# Patient Record
Sex: FEMALE | Employment: OTHER | URBAN - METROPOLITAN AREA
[De-identification: names, ages, dates, MRNs, and addresses within clinical notes are randomized per-mention and may not be internally consistent; named-entity substitution may affect disease eponyms.]

---

## 2022-05-26 ENCOUNTER — HOSPITAL ENCOUNTER (OUTPATIENT)
Dept: TELEMEDICINE | Facility: HOSPITAL | Age: 82
Discharge: HOME | End: 2022-05-26
Payer: MEDICARE

## 2022-05-26 PROCEDURE — G0427 PR INPT TELEHEALTH CON 70/>M: ICD-10-PCS | Mod: 95,,, | Performed by: PSYCHIATRY & NEUROLOGY

## 2022-05-26 PROCEDURE — G0427 INPT/ED TELECONSULT70: HCPCS | Mod: 95,,, | Performed by: PSYCHIATRY & NEUROLOGY

## 2022-05-27 ENCOUNTER — HOSPITAL ENCOUNTER (INPATIENT)
Facility: HOSPITAL | Age: 82
LOS: 3 days | Discharge: HOSPICE | DRG: 023 | End: 2022-05-30
Attending: EMERGENCY MEDICINE | Admitting: PSYCHIATRY & NEUROLOGY
Payer: MEDICARE

## 2022-05-27 DIAGNOSIS — I63.9 CEREBROVASCULAR ACCIDENT (CVA), UNSPECIFIED MECHANISM: ICD-10-CM

## 2022-05-27 DIAGNOSIS — I63.9 CVA (CEREBRAL VASCULAR ACCIDENT): ICD-10-CM

## 2022-05-27 DIAGNOSIS — I63.9 STROKE: Primary | ICD-10-CM

## 2022-05-27 DIAGNOSIS — R47.01 APHASIA: ICD-10-CM

## 2022-05-27 PROBLEM — I48.91 A-FIB: Status: ACTIVE | Noted: 2022-05-27

## 2022-05-27 PROBLEM — E03.9 HYPOTHYROIDISM: Status: ACTIVE | Noted: 2022-05-27

## 2022-05-27 PROBLEM — Z79.01 CURRENT USE OF LONG TERM ANTICOAGULATION: Status: ACTIVE | Noted: 2022-05-27

## 2022-05-27 PROBLEM — R53.81 DEBILITY: Status: ACTIVE | Noted: 2022-05-27

## 2022-05-27 PROBLEM — G93.6 CYTOTOXIC CEREBRAL EDEMA: Status: ACTIVE | Noted: 2022-05-27

## 2022-05-27 PROBLEM — I63.412 EMBOLIC STROKE INVOLVING LEFT MIDDLE CEREBRAL ARTERY: Status: ACTIVE | Noted: 2022-05-27

## 2022-05-27 LAB
ABO + RH BLD: NORMAL
ALBUMIN SERPL BCP-MCNC: 3.6 G/DL (ref 3.5–5.2)
ALBUMIN SERPL BCP-MCNC: 4 G/DL (ref 3.5–5.2)
ALP SERPL-CCNC: 55 U/L (ref 55–135)
ALP SERPL-CCNC: 57 U/L (ref 55–135)
ALT SERPL W/O P-5'-P-CCNC: 12 U/L (ref 10–44)
ALT SERPL W/O P-5'-P-CCNC: 16 U/L (ref 10–44)
ANION GAP SERPL CALC-SCNC: 11 MMOL/L (ref 8–16)
ANION GAP SERPL CALC-SCNC: 13 MMOL/L (ref 8–16)
ASCENDING AORTA: 2.94 CM
AST SERPL-CCNC: 22 U/L (ref 10–40)
AST SERPL-CCNC: 35 U/L (ref 10–40)
AV INDEX (PROSTH): 0.98
AV MEAN GRADIENT: 2 MMHG
AV PEAK GRADIENT: 4 MMHG
AV VALVE AREA: 3.41 CM2
AV VELOCITY RATIO: 0.83
BASOPHILS # BLD AUTO: 0.02 K/UL (ref 0–0.2)
BASOPHILS # BLD AUTO: 0.03 K/UL (ref 0–0.2)
BASOPHILS NFR BLD: 0.2 % (ref 0–1.9)
BASOPHILS NFR BLD: 0.3 % (ref 0–1.9)
BILIRUB SERPL-MCNC: 0.7 MG/DL (ref 0.1–1)
BILIRUB SERPL-MCNC: 0.7 MG/DL (ref 0.1–1)
BLD GP AB SCN CELLS X3 SERPL QL: NORMAL
BUN SERPL-MCNC: 14 MG/DL (ref 8–23)
BUN SERPL-MCNC: 15 MG/DL (ref 8–23)
BUN SERPL-MCNC: 20 MG/DL (ref 6–30)
CALCIUM SERPL-MCNC: 8.9 MG/DL (ref 8.7–10.5)
CALCIUM SERPL-MCNC: 9.3 MG/DL (ref 8.7–10.5)
CHLORIDE SERPL-SCNC: 101 MMOL/L (ref 95–110)
CHLORIDE SERPL-SCNC: 103 MMOL/L (ref 95–110)
CHLORIDE SERPL-SCNC: 104 MMOL/L (ref 95–110)
CHOLEST SERPL-MCNC: 154 MG/DL (ref 120–199)
CHOLEST SERPL-MCNC: 170 MG/DL (ref 120–199)
CHOLEST/HDLC SERPL: 3.9 {RATIO} (ref 2–5)
CHOLEST/HDLC SERPL: 4.1 {RATIO} (ref 2–5)
CO2 SERPL-SCNC: 22 MMOL/L (ref 23–29)
CO2 SERPL-SCNC: 22 MMOL/L (ref 23–29)
CREAT SERPL-MCNC: 0.7 MG/DL (ref 0.5–1.4)
CREAT SERPL-MCNC: 0.8 MG/DL (ref 0.5–1.4)
CTP QC/QA: YES
CV ECHO LV RWT: 0.41 CM
DIFFERENTIAL METHOD: ABNORMAL
DIFFERENTIAL METHOD: ABNORMAL
DIGOXIN SERPL-MCNC: 0.5 NG/ML (ref 0.8–2)
DOP CALC AO PEAK VEL: 1.03 M/S
DOP CALC AO VTI: 21.9 CM
DOP CALC LVOT AREA: 3.5 CM2
DOP CALC LVOT DIAMETER: 2.1 CM
DOP CALC LVOT PEAK VEL: 0.85 M/S
DOP CALC LVOT STROKE VOLUME: 74.67 CM3
DOP CALCLVOT PEAK VEL VTI: 21.57 CM
E/E' RATIO: 9.77 M/S
ECHO LV POSTERIOR WALL: 0.87 CM (ref 0.6–1.1)
EJECTION FRACTION: 65 %
EOSINOPHIL # BLD AUTO: 0 K/UL (ref 0–0.5)
EOSINOPHIL # BLD AUTO: 0 K/UL (ref 0–0.5)
EOSINOPHIL NFR BLD: 0 % (ref 0–8)
EOSINOPHIL NFR BLD: 0.1 % (ref 0–8)
ERYTHROCYTE [DISTWIDTH] IN BLOOD BY AUTOMATED COUNT: 13.8 % (ref 11.5–14.5)
ERYTHROCYTE [DISTWIDTH] IN BLOOD BY AUTOMATED COUNT: 13.9 % (ref 11.5–14.5)
EST. GFR  (AFRICAN AMERICAN): >60 ML/MIN/1.73 M^2
EST. GFR  (AFRICAN AMERICAN): >60 ML/MIN/1.73 M^2
EST. GFR  (NON AFRICAN AMERICAN): >60 ML/MIN/1.73 M^2
EST. GFR  (NON AFRICAN AMERICAN): >60 ML/MIN/1.73 M^2
ESTIMATED AVG GLUCOSE: 97 MG/DL (ref 68–131)
FRACTIONAL SHORTENING: 37 % (ref 28–44)
GLUCOSE SERPL-MCNC: 138 MG/DL (ref 70–110)
GLUCOSE SERPL-MCNC: 139 MG/DL (ref 70–110)
GLUCOSE SERPL-MCNC: 157 MG/DL (ref 70–110)
HBA1C MFR BLD: 5 % (ref 4–5.6)
HCT VFR BLD AUTO: 38.5 % (ref 37–48.5)
HCT VFR BLD AUTO: 38.5 % (ref 37–48.5)
HCT VFR BLD CALC: 40 %PCV (ref 36–54)
HCV AB SERPL QL IA: NEGATIVE
HDLC SERPL-MCNC: 40 MG/DL (ref 40–75)
HDLC SERPL-MCNC: 41 MG/DL (ref 40–75)
HDLC SERPL: 24.1 % (ref 20–50)
HDLC SERPL: 26 % (ref 20–50)
HGB BLD-MCNC: 12 G/DL (ref 12–16)
HGB BLD-MCNC: 12.2 G/DL (ref 12–16)
IMM GRANULOCYTES # BLD AUTO: 0.03 K/UL (ref 0–0.04)
IMM GRANULOCYTES # BLD AUTO: 0.05 K/UL (ref 0–0.04)
IMM GRANULOCYTES NFR BLD AUTO: 0.3 % (ref 0–0.5)
IMM GRANULOCYTES NFR BLD AUTO: 0.4 % (ref 0–0.5)
INR PPP: 1.1 (ref 0.8–1.2)
INTERVENTRICULAR SEPTUM: 0.82 CM (ref 0.6–1.1)
IVRT: 55.19 MSEC
LA MAJOR: 6.4 CM
LA MINOR: 6.38 CM
LA WIDTH: 4.74 CM
LDLC SERPL CALC-MCNC: 104.6 MG/DL (ref 63–159)
LDLC SERPL CALC-MCNC: 114 MG/DL (ref 63–159)
LEFT ATRIUM SIZE: 4.05 CM
LEFT ATRIUM VOLUME MOD: 82.42 CM3
LEFT ATRIUM VOLUME: 104.27 CM3
LEFT INTERNAL DIMENSION IN SYSTOLE: 2.7 CM (ref 2.1–4)
LEFT VENTRICLE DIASTOLIC VOLUME: 81.97 ML
LEFT VENTRICLE SYSTOLIC VOLUME: 27.12 ML
LEFT VENTRICULAR INTERNAL DIMENSION IN DIASTOLE: 4.28 CM (ref 3.5–6)
LEFT VENTRICULAR MASS: 112.4 G
LV LATERAL E/E' RATIO: 8.47 M/S
LV SEPTAL E/E' RATIO: 11.55 M/S
LYMPHOCYTES # BLD AUTO: 0.4 K/UL (ref 1–4.8)
LYMPHOCYTES # BLD AUTO: 0.4 K/UL (ref 1–4.8)
LYMPHOCYTES NFR BLD: 2.8 % (ref 18–48)
LYMPHOCYTES NFR BLD: 4.2 % (ref 18–48)
MAGNESIUM SERPL-MCNC: 1.8 MG/DL (ref 1.6–2.6)
MCH RBC QN AUTO: 26.7 PG (ref 27–31)
MCH RBC QN AUTO: 26.8 PG (ref 27–31)
MCHC RBC AUTO-ENTMCNC: 31.2 G/DL (ref 32–36)
MCHC RBC AUTO-ENTMCNC: 31.7 G/DL (ref 32–36)
MCV RBC AUTO: 85 FL (ref 82–98)
MCV RBC AUTO: 86 FL (ref 82–98)
MONOCYTES # BLD AUTO: 0.3 K/UL (ref 0.3–1)
MONOCYTES # BLD AUTO: 0.4 K/UL (ref 0.3–1)
MONOCYTES NFR BLD: 3.3 % (ref 4–15)
MONOCYTES NFR BLD: 3.5 % (ref 4–15)
MV PEAK E VEL: 1.27 M/S
NEUTROPHILS # BLD AUTO: 11.6 K/UL (ref 1.8–7.7)
NEUTROPHILS # BLD AUTO: 9.2 K/UL (ref 1.8–7.7)
NEUTROPHILS NFR BLD: 91.8 % (ref 38–73)
NEUTROPHILS NFR BLD: 93.1 % (ref 38–73)
NONHDLC SERPL-MCNC: 114 MG/DL
NONHDLC SERPL-MCNC: 129 MG/DL
NRBC BLD-RTO: 0 /100 WBC
NRBC BLD-RTO: 0 /100 WBC
PHOSPHATE SERPL-MCNC: 2.7 MG/DL (ref 2.7–4.5)
PISA TR MAX VEL: 3.3 M/S
PLATELET # BLD AUTO: 272 K/UL (ref 150–450)
PLATELET # BLD AUTO: 278 K/UL (ref 150–450)
PMV BLD AUTO: 11 FL (ref 9.2–12.9)
PMV BLD AUTO: 11.6 FL (ref 9.2–12.9)
POC IONIZED CALCIUM: 1.12 MMOL/L (ref 1.06–1.42)
POC PTINR: 1 (ref 0.9–1.2)
POC PTWBT: 11.7 SEC (ref 9.7–14.3)
POC TCO2 (MEASURED): 28 MMOL/L (ref 23–29)
POCT GLUCOSE: 140 MG/DL (ref 70–110)
POCT GLUCOSE: 142 MG/DL (ref 70–110)
POCT GLUCOSE: 149 MG/DL (ref 70–110)
POTASSIUM BLD-SCNC: 4.2 MMOL/L (ref 3.5–5.1)
POTASSIUM SERPL-SCNC: 3.3 MMOL/L (ref 3.5–5.1)
POTASSIUM SERPL-SCNC: 4.1 MMOL/L (ref 3.5–5.1)
PROT SERPL-MCNC: 6.3 G/DL (ref 6–8.4)
PROT SERPL-MCNC: 7.3 G/DL (ref 6–8.4)
PROTHROMBIN TIME: 11.3 SEC (ref 9–12.5)
RA MAJOR: 6.2 CM
RA PRESSURE: 15 MMHG
RA WIDTH: 4.43 CM
RBC # BLD AUTO: 4.5 M/UL (ref 4–5.4)
RBC # BLD AUTO: 4.55 M/UL (ref 4–5.4)
RIGHT ATRIAL AREA: 25 CM2
RIGHT VENTRICULAR END-DIASTOLIC DIMENSION: 3.09 CM
RV TISSUE DOPPLER FREE WALL SYSTOLIC VELOCITY 1 (APICAL 4 CHAMBER VIEW): 14.85 CM/S
SAMPLE: ABNORMAL
SAMPLE: NORMAL
SAMPLE: NORMAL
SARS-COV-2 RDRP RESP QL NAA+PROBE: NEGATIVE
SINUS: 2.82 CM
SODIUM BLD-SCNC: 138 MMOL/L (ref 136–145)
SODIUM SERPL-SCNC: 137 MMOL/L (ref 136–145)
SODIUM SERPL-SCNC: 138 MMOL/L (ref 136–145)
STJ: 2.24 CM
T4 FREE SERPL-MCNC: 1.13 NG/DL (ref 0.71–1.51)
T4 FREE SERPL-MCNC: 1.16 NG/DL (ref 0.71–1.51)
TDI LATERAL: 0.15 M/S
TDI SEPTAL: 0.11 M/S
TDI: 0.13 M/S
TR MAX PG: 44 MMHG
TRICUSPID ANNULAR PLANE SYSTOLIC EXCURSION: 1.41 CM
TRIGL SERPL-MCNC: 47 MG/DL (ref 30–150)
TRIGL SERPL-MCNC: 75 MG/DL (ref 30–150)
TSH SERPL DL<=0.005 MIU/L-ACNC: 0.09 UIU/ML (ref 0.4–4)
TSH SERPL DL<=0.005 MIU/L-ACNC: 0.1 UIU/ML (ref 0.4–4)
TV REST PULMONARY ARTERY PRESSURE: 59 MMHG
WBC # BLD AUTO: 10.01 K/UL (ref 3.9–12.7)
WBC # BLD AUTO: 12.49 K/UL (ref 3.9–12.7)

## 2022-05-27 PROCEDURE — 84443 ASSAY THYROID STIM HORMONE: CPT | Performed by: EMERGENCY MEDICINE

## 2022-05-27 PROCEDURE — 85610 PROTHROMBIN TIME: CPT | Performed by: EMERGENCY MEDICINE

## 2022-05-27 PROCEDURE — 99900035 HC TECH TIME PER 15 MIN (STAT)

## 2022-05-27 PROCEDURE — 25000003 PHARM REV CODE 250: Performed by: NURSE PRACTITIONER

## 2022-05-27 PROCEDURE — 93010 ELECTROCARDIOGRAM REPORT: CPT | Mod: ,,, | Performed by: INTERNAL MEDICINE

## 2022-05-27 PROCEDURE — 20000000 HC ICU ROOM

## 2022-05-27 PROCEDURE — 84100 ASSAY OF PHOSPHORUS: CPT | Performed by: NURSE PRACTITIONER

## 2022-05-27 PROCEDURE — 97163 PT EVAL HIGH COMPLEX 45 MIN: CPT

## 2022-05-27 PROCEDURE — 97535 SELF CARE MNGMENT TRAINING: CPT

## 2022-05-27 PROCEDURE — 83735 ASSAY OF MAGNESIUM: CPT | Performed by: NURSE PRACTITIONER

## 2022-05-27 PROCEDURE — 84443 ASSAY THYROID STIM HORMONE: CPT | Mod: 91 | Performed by: NURSE PRACTITIONER

## 2022-05-27 PROCEDURE — 80061 LIPID PANEL: CPT | Mod: 91 | Performed by: NURSE PRACTITIONER

## 2022-05-27 PROCEDURE — 99285 EMERGENCY DEPT VISIT HI MDM: CPT | Mod: CS,,, | Performed by: EMERGENCY MEDICINE

## 2022-05-27 PROCEDURE — 84439 ASSAY OF FREE THYROXINE: CPT | Performed by: EMERGENCY MEDICINE

## 2022-05-27 PROCEDURE — 94761 N-INVAS EAR/PLS OXIMETRY MLT: CPT

## 2022-05-27 PROCEDURE — 82565 ASSAY OF CREATININE: CPT

## 2022-05-27 PROCEDURE — 80162 ASSAY OF DIGOXIN TOTAL: CPT | Performed by: NURSE PRACTITIONER

## 2022-05-27 PROCEDURE — 83036 HEMOGLOBIN GLYCOSYLATED A1C: CPT | Performed by: NURSE PRACTITIONER

## 2022-05-27 PROCEDURE — 97112 NEUROMUSCULAR REEDUCATION: CPT

## 2022-05-27 PROCEDURE — 97167 OT EVAL HIGH COMPLEX 60 MIN: CPT

## 2022-05-27 PROCEDURE — 99291 PR CRITICAL CARE, E/M 30-74 MINUTES: ICD-10-PCS | Mod: ,,, | Performed by: NURSE PRACTITIONER

## 2022-05-27 PROCEDURE — 86803 HEPATITIS C AB TEST: CPT | Performed by: EMERGENCY MEDICINE

## 2022-05-27 PROCEDURE — 99285 PR EMERGENCY DEPT VISIT,LEVEL V: ICD-10-PCS | Mod: CS,,, | Performed by: EMERGENCY MEDICINE

## 2022-05-27 PROCEDURE — 99223 1ST HOSP IP/OBS HIGH 75: CPT | Mod: ,,, | Performed by: PSYCHIATRY & NEUROLOGY

## 2022-05-27 PROCEDURE — 93005 ELECTROCARDIOGRAM TRACING: CPT

## 2022-05-27 PROCEDURE — 85025 COMPLETE CBC W/AUTO DIFF WBC: CPT | Mod: 91 | Performed by: NURSE PRACTITIONER

## 2022-05-27 PROCEDURE — 99223 PR INITIAL HOSPITAL CARE,LEVL III: ICD-10-PCS | Mod: ,,, | Performed by: PSYCHIATRY & NEUROLOGY

## 2022-05-27 PROCEDURE — 80053 COMPREHEN METABOLIC PANEL: CPT | Performed by: EMERGENCY MEDICINE

## 2022-05-27 PROCEDURE — 86901 BLOOD TYPING SEROLOGIC RH(D): CPT | Performed by: NURSE PRACTITIONER

## 2022-05-27 PROCEDURE — 63600175 PHARM REV CODE 636 W HCPCS: Performed by: NURSE PRACTITIONER

## 2022-05-27 PROCEDURE — 25000003 PHARM REV CODE 250: Performed by: PSYCHIATRY & NEUROLOGY

## 2022-05-27 PROCEDURE — 80053 COMPREHEN METABOLIC PANEL: CPT | Mod: 91 | Performed by: NURSE PRACTITIONER

## 2022-05-27 PROCEDURE — 63600175 PHARM REV CODE 636 W HCPCS: Performed by: NEUROLOGICAL SURGERY

## 2022-05-27 PROCEDURE — 93010 EKG 12-LEAD: ICD-10-PCS | Mod: ,,, | Performed by: INTERNAL MEDICINE

## 2022-05-27 PROCEDURE — 85025 COMPLETE CBC W/AUTO DIFF WBC: CPT | Performed by: EMERGENCY MEDICINE

## 2022-05-27 PROCEDURE — 99285 EMERGENCY DEPT VISIT HI MDM: CPT | Mod: 25

## 2022-05-27 PROCEDURE — 84439 ASSAY OF FREE THYROXINE: CPT | Mod: 91 | Performed by: NURSE PRACTITIONER

## 2022-05-27 PROCEDURE — 99291 CRITICAL CARE FIRST HOUR: CPT | Mod: ,,, | Performed by: NURSE PRACTITIONER

## 2022-05-27 PROCEDURE — 25500020 PHARM REV CODE 255: Performed by: EMERGENCY MEDICINE

## 2022-05-27 PROCEDURE — 97530 THERAPEUTIC ACTIVITIES: CPT

## 2022-05-27 PROCEDURE — 80061 LIPID PANEL: CPT | Performed by: EMERGENCY MEDICINE

## 2022-05-27 PROCEDURE — 85610 PROTHROMBIN TIME: CPT

## 2022-05-27 PROCEDURE — U0002 COVID-19 LAB TEST NON-CDC: HCPCS | Performed by: EMERGENCY MEDICINE

## 2022-05-27 RX ORDER — LEVOTHYROXINE SODIUM 88 UG/1
88 TABLET ORAL
COMMUNITY

## 2022-05-27 RX ORDER — AMOXICILLIN 250 MG
1 CAPSULE ORAL 2 TIMES DAILY
Status: DISCONTINUED | OUTPATIENT
Start: 2022-05-27 | End: 2022-05-27

## 2022-05-27 RX ORDER — HYDRALAZINE HYDROCHLORIDE 20 MG/ML
10 INJECTION INTRAMUSCULAR; INTRAVENOUS EVERY 4 HOURS PRN
Status: DISCONTINUED | OUTPATIENT
Start: 2022-05-27 | End: 2022-05-27

## 2022-05-27 RX ORDER — LANOLIN ALCOHOL/MO/W.PET/CERES
800 CREAM (GRAM) TOPICAL
Status: DISCONTINUED | OUTPATIENT
Start: 2022-05-27 | End: 2022-05-30

## 2022-05-27 RX ORDER — ENALAPRIL MALEATE 10 MG/1
10 TABLET ORAL 2 TIMES DAILY
COMMUNITY

## 2022-05-27 RX ORDER — AMOXICILLIN 250 MG
1 CAPSULE ORAL 2 TIMES DAILY
Status: DISCONTINUED | OUTPATIENT
Start: 2022-05-27 | End: 2022-05-30

## 2022-05-27 RX ORDER — SODIUM,POTASSIUM PHOSPHATES 280-250MG
2 POWDER IN PACKET (EA) ORAL
Status: DISCONTINUED | OUTPATIENT
Start: 2022-05-27 | End: 2022-05-30

## 2022-05-27 RX ORDER — NICARDIPINE HYDROCHLORIDE 0.2 MG/ML
0-15 INJECTION INTRAVENOUS CONTINUOUS
Status: DISCONTINUED | OUTPATIENT
Start: 2022-05-27 | End: 2022-05-27

## 2022-05-27 RX ORDER — NICARDIPINE HYDROCHLORIDE 0.2 MG/ML
0-15 INJECTION INTRAVENOUS CONTINUOUS
Status: DISCONTINUED | OUTPATIENT
Start: 2022-05-27 | End: 2022-05-28

## 2022-05-27 RX ORDER — MUPIROCIN 20 MG/G
OINTMENT TOPICAL 2 TIMES DAILY
Status: DISCONTINUED | OUTPATIENT
Start: 2022-05-27 | End: 2022-05-30

## 2022-05-27 RX ORDER — LEVOTHYROXINE SODIUM 88 UG/1
88 TABLET ORAL
Status: DISCONTINUED | OUTPATIENT
Start: 2022-05-27 | End: 2022-05-30

## 2022-05-27 RX ORDER — DIGOXIN 250 MCG
250 TABLET ORAL DAILY
COMMUNITY

## 2022-05-27 RX ORDER — LABETALOL HYDROCHLORIDE 5 MG/ML
10 INJECTION, SOLUTION INTRAVENOUS EVERY 4 HOURS PRN
Status: DISCONTINUED | OUTPATIENT
Start: 2022-05-27 | End: 2022-05-30

## 2022-05-27 RX ORDER — HYDROCHLOROTHIAZIDE 12.5 MG/1
25 TABLET ORAL DAILY
COMMUNITY

## 2022-05-27 RX ORDER — SODIUM CHLORIDE 9 MG/ML
INJECTION, SOLUTION INTRAVENOUS CONTINUOUS
Status: DISCONTINUED | OUTPATIENT
Start: 2022-05-27 | End: 2022-05-27

## 2022-05-27 RX ORDER — DIGOXIN 125 MCG
0.25 TABLET ORAL DAILY
Status: DISCONTINUED | OUTPATIENT
Start: 2022-05-27 | End: 2022-05-30

## 2022-05-27 RX ORDER — SODIUM CHLORIDE 0.9 % (FLUSH) 0.9 %
10 SYRINGE (ML) INJECTION
Status: DISCONTINUED | OUTPATIENT
Start: 2022-05-27 | End: 2022-05-30

## 2022-05-27 RX ORDER — LABETALOL HYDROCHLORIDE 5 MG/ML
10 INJECTION, SOLUTION INTRAVENOUS EVERY 4 HOURS PRN
Status: DISCONTINUED | OUTPATIENT
Start: 2022-05-27 | End: 2022-05-27

## 2022-05-27 RX ORDER — HYDRALAZINE HYDROCHLORIDE 20 MG/ML
10 INJECTION INTRAMUSCULAR; INTRAVENOUS EVERY 4 HOURS PRN
Status: DISCONTINUED | OUTPATIENT
Start: 2022-05-27 | End: 2022-05-30

## 2022-05-27 RX ORDER — ATORVASTATIN CALCIUM 40 MG/1
40 TABLET, FILM COATED ORAL DAILY
Status: DISCONTINUED | OUTPATIENT
Start: 2022-05-27 | End: 2022-05-27

## 2022-05-27 RX ORDER — HEPARIN SODIUM 1000 [USP'U]/ML
INJECTION, SOLUTION INTRAVENOUS; SUBCUTANEOUS CODE/TRAUMA/SEDATION MEDICATION
Status: COMPLETED | OUTPATIENT
Start: 2022-05-27 | End: 2022-05-27

## 2022-05-27 RX ORDER — FENTANYL CITRATE 50 UG/ML
INJECTION, SOLUTION INTRAMUSCULAR; INTRAVENOUS CODE/TRAUMA/SEDATION MEDICATION
Status: COMPLETED | OUTPATIENT
Start: 2022-05-27 | End: 2022-05-27

## 2022-05-27 RX ORDER — METOPROLOL TARTRATE 25 MG/1
12.5 TABLET ORAL 2 TIMES DAILY
Status: DISCONTINUED | OUTPATIENT
Start: 2022-05-27 | End: 2022-05-30

## 2022-05-27 RX ORDER — ACETAMINOPHEN 325 MG/1
650 TABLET ORAL EVERY 6 HOURS PRN
Status: DISCONTINUED | OUTPATIENT
Start: 2022-05-27 | End: 2022-05-27

## 2022-05-27 RX ORDER — ATORVASTATIN CALCIUM 20 MG/1
40 TABLET, FILM COATED ORAL DAILY
Status: DISCONTINUED | OUTPATIENT
Start: 2022-05-27 | End: 2022-05-30

## 2022-05-27 RX ORDER — ACETAMINOPHEN 325 MG/1
650 TABLET ORAL EVERY 6 HOURS PRN
Status: DISCONTINUED | OUTPATIENT
Start: 2022-05-27 | End: 2022-05-30

## 2022-05-27 RX ORDER — ONDANSETRON 2 MG/ML
4 INJECTION INTRAMUSCULAR; INTRAVENOUS EVERY 6 HOURS PRN
Status: DISCONTINUED | OUTPATIENT
Start: 2022-05-27 | End: 2022-05-30

## 2022-05-27 RX ORDER — IODIXANOL 320 MG/ML
200 INJECTION, SOLUTION INTRAVASCULAR
Status: COMPLETED | OUTPATIENT
Start: 2022-05-27 | End: 2022-05-27

## 2022-05-27 RX ORDER — METOPROLOL SUCCINATE 25 MG/1
25 TABLET, EXTENDED RELEASE ORAL DAILY
COMMUNITY

## 2022-05-27 RX ADMIN — SODIUM CHLORIDE: 0.9 INJECTION, SOLUTION INTRAVENOUS at 05:05

## 2022-05-27 RX ADMIN — ONDANSETRON 4 MG: 2 INJECTION INTRAMUSCULAR; INTRAVENOUS at 11:05

## 2022-05-27 RX ADMIN — DIGOXIN 0.25 MG: 125 TABLET ORAL at 09:05

## 2022-05-27 RX ADMIN — Medication 800 MG: at 03:05

## 2022-05-27 RX ADMIN — IODIXANOL 75 ML: 320 INJECTION, SOLUTION INTRAVASCULAR at 04:05

## 2022-05-27 RX ADMIN — HEPARIN SODIUM 25000 UNITS: 1000 INJECTION INTRAVENOUS; SUBCUTANEOUS at 03:05

## 2022-05-27 RX ADMIN — METOPROLOL TARTRATE 12.5 MG: 25 TABLET, FILM COATED ORAL at 08:05

## 2022-05-27 RX ADMIN — POTASSIUM BICARBONATE 50 MEQ: 978 TABLET, EFFERVESCENT ORAL at 03:05

## 2022-05-27 RX ADMIN — ATORVASTATIN CALCIUM 40 MG: 20 TABLET, FILM COATED ORAL at 09:05

## 2022-05-27 RX ADMIN — MUPIROCIN: 20 OINTMENT TOPICAL at 08:05

## 2022-05-27 RX ADMIN — Medication 800 MG: at 09:05

## 2022-05-27 RX ADMIN — NICARDIPINE HYDROCHLORIDE 2.5 MG/HR: 0.2 INJECTION, SOLUTION INTRAVENOUS at 09:05

## 2022-05-27 RX ADMIN — FENTANYL CITRATE 50 MCG: 50 INJECTION, SOLUTION INTRAMUSCULAR; INTRAVENOUS at 03:05

## 2022-05-27 RX ADMIN — NICARDIPINE HYDROCHLORIDE 5 MG/HR: 0.2 INJECTION, SOLUTION INTRAVENOUS at 03:05

## 2022-05-27 RX ADMIN — HYDRALAZINE HYDROCHLORIDE 10 MG: 20 INJECTION, SOLUTION INTRAMUSCULAR; INTRAVENOUS at 06:05

## 2022-05-27 RX ADMIN — LEVOTHYROXINE SODIUM 88 MCG: 88 TABLET ORAL at 09:05

## 2022-05-27 RX ADMIN — SENNOSIDES AND DOCUSATE SODIUM 1 TABLET: 50; 8.6 TABLET ORAL at 09:05

## 2022-05-27 RX ADMIN — METOPROLOL TARTRATE 12.5 MG: 25 TABLET, FILM COATED ORAL at 09:05

## 2022-05-27 RX ADMIN — SENNOSIDES AND DOCUSATE SODIUM 1 TABLET: 50; 8.6 TABLET ORAL at 08:05

## 2022-05-27 RX ADMIN — LABETALOL HYDROCHLORIDE 10 MG: 5 INJECTION INTRAVENOUS at 06:05

## 2022-05-27 RX ADMIN — NICARDIPINE HYDROCHLORIDE 5 MG/HR: 0.2 INJECTION, SOLUTION INTRAVENOUS at 07:05

## 2022-05-27 RX ADMIN — MUPIROCIN: 20 OINTMENT TOPICAL at 09:05

## 2022-05-27 RX ADMIN — NICARDIPINE HYDROCHLORIDE 5 MG/HR: 0.2 INJECTION, SOLUTION INTRAVENOUS at 08:05

## 2022-05-27 NOTE — NURSING
Patient arrived to IR    Type of stroke/diagnosis:  Left MCA stroke      Thrombectomy start and end time   Start time 0340  End time 0423    Current symptoms: Aphasic, follows simple commands unable to move right side, spont movement of left side, right side facial droop    Skin assessment done: Y    Wounds noted: None      Laura Completed? N    Patient Belongings on Admit: None noted    NCC notified: Raza FELICIANO

## 2022-05-27 NOTE — ASSESSMENT & PLAN NOTE
Emilia Cotto is a 81 y.o. female with significant medical history of Afib (on Eliquis), HTN, hypothyroidism,  presented to hospital as a transfer from [facility] for evaluation of L MCA stroke.  tPA not administered due to the patient taking Eliquis.  CTA head neck with occlusion of the L2 segment of the MCA.  The patient taken to IR for 3 by angiogram, possible thrombectomy.      Antithrombotics for secondary stroke prevention: Anticoagulants: Apixaban 5 mg BID     Statins for secondary stroke prevention and hyperlipidemia, if present:   Statins: Atorvastatin- 40 mg daily    Aggressive risk factor modification: HTN, A-Fib     Rehab efforts: The patient has been evaluated by a stroke team provider and the therapy needs have been fully considered based off the presenting complaints and exam findings. The following therapy evaluations are needed: PT evaluate and treat, OT evaluate and treat, SLP evaluate and treat    Diagnostics ordered/pending: HgbA1C to assess blood glucose levels, Lipid Profile to assess cholesterol levels, MRI head without contrast to assess brain parenchyma, TTE to assess cardiac function/status , TSH to assess thyroid function, Other: Cerebral Angiogram    VTE prophylaxis: Mechanical prophylaxis: Place SCDs  None: Reason for No Pharmacological VTE Prophylaxis: Currently on anticoagulation    BP parameters: Infarct: Post sucessful thrombectomy, SBP <140  Post unsucessful thrombectomy, SBP <220

## 2022-05-27 NOTE — ASSESSMENT & PLAN NOTE
-Small area of cytotoxic cerebral edema identified when reviewing brain imaging in the territory of the L middle cerebral artery. There is no mass effect associated with it. We will continue to monitor the patients clinical exam with Q4h neuro checks while on the floor, more frequently while in neuro critical care, for any worsening of symptoms which may indicate expansion of the insult and/or area of the edema resulting in clinical change that may require acute intervention to prevent loss of function and/or death. The pattern is suggestive of embolic etiology.

## 2022-05-27 NOTE — SUBJECTIVE & OBJECTIVE
Past Medical History:   Diagnosis Date    A-fib     A-fib     Anticoagulant long-term use     Hypertension     Thyroid disease      Past Surgical History:   Procedure Laterality Date    HYSTERECTOMY      THYROID SURGERY Right     lobectomy      No current facility-administered medications on file prior to encounter.     Current Outpatient Medications on File Prior to Encounter   Medication Sig Dispense Refill    apixaban (ELIQUIS) 5 mg Tab Take 5 mg by mouth 2 (two) times daily.      digoxin (LANOXIN) 250 mcg tablet Take 250 mcg by mouth once daily.      enalapril (VASOTEC) 10 MG tablet Take 10 mg by mouth 2 (two) times daily.      hydroCHLOROthiazide (HYDRODIURIL) 12.5 MG Tab Take 25 mg by mouth once daily.      levothyroxine (SYNTHROID) 88 MCG tablet Take 88 mcg by mouth before breakfast.      metoprolol succinate (TOPROL-XL) 25 MG 24 hr tablet Take 25 mg by mouth once daily.        Allergies: Patient has no known allergies.    Family History   Problem Relation Age of Onset    Diabetes Mother     Diabetes Sister      Social History     Tobacco Use    Smoking status: Former Smoker     Quit date: 1995     Years since quittin.9    Smokeless tobacco: Former User   Substance Use Topics    Alcohol use: Not Currently     Review of Systems   Unable to perform ROS: Patient nonverbal (aphasia)   Objective:     Vitals:    Pulse: 78  BP: (!) 184/81  MAP (mmHg): 117  Resp: (!) 22  SpO2: 98 %    Pulse  Min: 78  Max: 117  BP  Min: 184/81  Max: 210/98  MAP (mmHg)  Min: 117  Max: 140  Resp  Min: 22  Max: 22  SpO2  Min: 98 %  Max: 98 %    No intake/output data recorded.           Physical Exam  Vitals and nursing note reviewed.   HENT:      Head: Normocephalic and atraumatic.      Nose: Nose normal.      Mouth/Throat:      Mouth: Mucous membranes are moist.      Pharynx: Oropharynx is clear.   Cardiovascular:      Rate and Rhythm: Tachycardia present. Rhythm irregular.      Pulses: Normal pulses.      Heart sounds: Normal  heart sounds.   Pulmonary:      Effort: Pulmonary effort is normal.      Breath sounds: Normal breath sounds.   Abdominal:      General: Bowel sounds are normal.      Palpations: Abdomen is soft.   Skin:     General: Skin is warm and dry.      Capillary Refill: Capillary refill takes 2 to 3 seconds.   Neurological:      Comments: E4 V2 M5  PERRLA, Left gaze deviation  Aphasic/dysarthria  Right facial weakness  RSW, and decreased sensation    NIH 21       Today I personally reviewed pertinent medications, lines/drains/airways, imaging, cardiology results, laboratory results, microbiology results,
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
## 2022-05-27 NOTE — PT/OT/SLP EVAL
"Physical Therapy Co-Evaluation and Co-Treatment    Patient Name:  Emilia Cotto   MRN:  [id number]    Recommendations:     Discharge Recommendations:  rehabilitation facility (pending progress)   Discharge Equipment Recommendations:  (TBD)   Barriers to discharge: Increased level of assist    Assessment:     Emilia Cotto is a 81 y.o. female admitted with a medical diagnosis of <principal problem not specified>.  She presents with the following impairments/functional limitations:  weakness, impaired endurance, impaired sensation, impaired self care skills, impaired functional mobilty, gait instability, impaired balance, decreased upper extremity function, decreased lower extremity function, decreased safety awareness, impaired coordination, impaired fine motor, decreased ROM, abnormal tone. Once medically stable, recommending pt discharge to Inpatient Rehabilitation Facility. Patient tolerated session fairly. Able to participate in sit to stand trial however with urinary incontinence in standing limiting further activity. Patient then vomited sitting edge of bed, necessitating return to [facility] for cleaning. BP checked sitting edge of bed, .    Rehab Prognosis: Good; patient would benefit from acute skilled PT services 3 x/week to address these deficits and reach maximum level of function.  Recent Surgery: * No surgery found *      Plan:     During this hospitalization, patient to be seen 3 x/week to address the identified rehab impairments via gait training, therapeutic activities, therapeutic exercises, neuromuscular re-education and progress toward the following goals:    · Plan of Care Expires:  06/27/22    Subjective     Chief Complaint: Unable to assess, patient non-verbal   Patient/Family Comments/Goals: "She was very independent"  Pain/Comfort:  · Pain Rating 1:  (unable to assess, patient non-verbal, no indicators of pain)    Patients cultural, spiritual, Buddhism conflicts given the current situation: " no    Living Environment:  Patient lives with 3 nephews and a niece in a single story home, number of outside stairs: threshold step, tub-shower combo. Per patient's daughter, patient will d/c to stay with her in single story home, number of outside stairs:3 with no HR (planning to install), tub-shower combo.   Prior to admission, patient was independent with ADLs, using rolling walker for ambulation rarely, driving short distances sometimes. Patient uses DME as follows: walker, rolling. DME owned (not currently used): bedside commode. Upon discharge, patient will have assistance from family. Family reports someone is with her 24/7. Once at her daughter's house, her daughter works and her son-in law/other family members can assist.    Objective:     Communicated with nursing prior to session.  Patient found HOB elevated with telemetry, blood pressure cuff, pulse ox (continuous), PureWick, restraints, peripheral IV upon PT entry to room.    General Precautions: Standard, fall   Orthopedic Precautions:N/A   Braces: N/A    Exams:  · Cognitive Exam:  Patient is oriented to unable to assess, patient non-verbal, follows commands 0% of the time, no visual tracking noted  · RLE ROM: WFL  · RLE Strength: 0/5  · LLE ROM: WFL  · LLE Strength: grossly 1/5  · Sensation: withdraws to pain to BLEs   · Tone: increased tone noted to R plantarflexors    Functional Mobility:  · Bed Mobility:     · Rolling Right:  maximal assistance  · Scooting: total assistance  · Supine to Sit: total assistance  · Sit to Supine: total assistance of 2 persons  · Transfers:     · Sit to Stand: moderate assistance of 2 persons with hand-held assist  · Gait: Deferred due to urinary incontinence in standing requiring cleaning   · Balance:   · Static Sitting: Good, able to maintain for 8 minute(s) with contact guard assistance progressing to stand by assistance, cuing for upright posture with upright head, moderate assist for upright head posture  initially  · Dynamic Sitting: Fair: Patient accepts minimal challenge, minimum assistance  · Static Standing: Poor, able to maintain for 1 minute(s) with maximal assistance of 2 persons, cues for upright posture  · Dynamic Standing: not assessed this visit    Therapeutic Activities and Exercises:  Patient educated on role of acute care PT and PT POC  Patient became soiled during session. Extra time spent changing linens and performing pericare with total assistance  Whiteboard updated, Patient is clear to stand pivot transfer with RN/PCT, assist x2    AM-PAC 6 CLICK MOBILITY  Total Score:8     Patient left HOB elevated with all lines intact, call button in reach and RN and family present.    GOALS:   Multidisciplinary Problems     Physical Therapy Goals        Problem: Physical Therapy    Goal Priority Disciplines Outcome Goal Variances Interventions   Physical Therapy Goal     PT, PT/OT Ongoing, Progressing     Description: Goals to be met by: 6/10/2022     Patient will increase functional independence with mobility by performin. Supine to sit with moderate assistance  2. Sit to supine with moderate assistance  3. Sit to stand transfer with contact guard assistance  4. Bed to chair transfer with minimum assistance using LRAD as needed  5. Gait  x 10 feet with minimum assistance using LRAD as needed  6. Stand for 3 minutes with minimum assistance using LRAD as needed  7. Lower extremity exercise program x10 reps per handout, with supervision                    History:     Past Medical History:   Diagnosis Date    A-fib     A-fib     Anticoagulant long-term use     Hypertension     Thyroid disease        Past Surgical History:   Procedure Laterality Date    HYSTERECTOMY      THYROID SURGERY Right     lobectomy       Time Tracking:     PT Received On: 22  PT Start Time: 1045     PT Stop Time: 1119  PT Total Time (min): 34 min     Billable Minutes: Evaluation 8 min Therapeutic Activity 16 min and  Neuromuscular Re-education 8 min     05/27/2022    Co-evaluation and co-treatment performed for this visit due to suspected patient need for two skilled therapists to ensure patient and staff safety and to accommodate for patient activity tolerance/pain management

## 2022-05-27 NOTE — PLAN OF CARE
[facility] Care Plan    POC reviewed with Emilia Cotto and family at 1400. Pt shows no evidence of learning. Family verbalized understanding. Questions and concerns addressed. No acute events today. Pt progressing toward goals. Will continue to monitor. See below and flowsheets for full assessment and VS info.     - MRI brain wo Con  - Echo        Is this a stroke patient? yes- Stroke booklet reviewed with family, risk factors identified for patient and stroke booklet remains at bedside for ongoing education.     Neuro:  [place] Coma Scale  Best Eye Response: 2-->(E2) to pain  Best Motor Response: 6-->(M6) obeys commands (simple)  Best Verbal Response: 1-->(V1) none  [place] Coma Scale Score: 9  Assessment Qualifiers: patient not sedated/intubated  Pupil PERRLA: yes     24 hr Temp:  [97.7 °F (36.5 °C)-97.9 °F (36.6 °C)]     CV:   Rhythm: atrial rhythm  BP goals:   SBP < 140  MAP > 65    Resp:   O2 Device (Oxygen Therapy): room air       Plan: N/A    GI/:     Diet/Nutrition Received: NPO  Last Bowel Movement: 05/27/22  Voiding Characteristics: incontinence, external catheter    Intake/Output Summary (Last 24 hours) at 5/27/2022 1455  Last data filed at 5/27/2022 1402  Gross per 24 hour   Intake 314.64 ml   Output --   Net 314.64 ml     Unmeasured Output  Urine Occurrence: 1  Stool Occurrence: 1  Emesis Occurrence: 1    Labs/Accuchecks:  Recent Labs   Lab 05/27/22  0508   WBC 12.49   RBC 4.50   HGB 12.0   HCT 38.5         Recent Labs   Lab 05/27/22  0508      K 3.3*   CO2 22*      BUN 14   CREATININE 0.7   ALKPHOS 57   ALT 12   AST 22   BILITOT 0.7      Recent Labs   Lab 05/27/22  0047   INR 1.1    No results for input(s): CPK, CPKMB, TROPONINI, MB in the last 168 hours.    Electrolytes: No replacement orders  Accuchecks: none    Gtts:   nicardipine 2.5 mg/hr (05/27/22 1402)       LDA/Wounds:  Lines/Drains/Airways       Peripheral Intravenous Line  Duration                  Peripheral IV - Single  Lumen 05/27/22 0043 18 G Right Antecubital <1 day         Peripheral IV - Single Lumen 05/27/22 0044 20 G Left Wrist <1 day                  Wounds: No  Wound care consulted: No

## 2022-05-27 NOTE — BRIEF OP NOTE
[facility]   Interv Radiology / Endovascular Neurosurgery - [facility]  Brief Operative Note    SUMMARY     Surgery Date: 05/27/22    Surgeon: Roland Lynn MD,MSc    Assisting Surgeon: None     Pre-op Diagnosis:  Left MCA stroke    Post-op Diagnosis:  Left MCA stroke     Procedure:   Trans femoral cerebral angiogram, Selected RIGHT femoral artery, LEFT CCA, LEFT ICA , Mechanical thrombectomy     Anesthesia: Sedation, by IR team    Operative Findings: LEFT MCA occlusion, s/p mechanical primary aspiration thrombectomy TICI 2b    Estimated Blood Loss: minimal      Arterial Access: RIGHT common femoral artery, with 8f sheath.     Arterial closure: Perclose      Complications: None      Estimated Blood Loss has been documented.         Specimens: None   Specimen (24h ago, onward)            None        Disposition: Neuro critical care ICU    Roland Lynn MD,MSc  Department of Neurosurgery

## 2022-05-27 NOTE — PLAN OF CARE
PT eval complete, plan of care established    2022    Problem: Physical Therapy  Goal: Physical Therapy Goal  Description: Goals to be met by: 6/10/2022     Patient will increase functional independence with mobility by performin. Supine to sit with moderate assistance  2. Sit to supine with moderate assistance  3. Sit to stand transfer with contact guard assistance  4. Bed to chair transfer with minimum assistance using LRAD as needed  5. Gait  x 10 feet with minimum assistance using LRAD as needed  6. Stand for 3 minutes with minimum assistance using LRAD as needed  7. Lower extremity exercise program x10 reps per handout, with supervision   Outcome: Ongoing, Progressing
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
## 2022-05-27 NOTE — PLAN OF CARE
Pt completed cerebral angiogram with thrombectomy without adverse event. Fourth pass with TICI 2B (previous passes with no TICI score). Perclose deployed to right groin successfully with hemostasis achieved at 0420. Pt to remain flat for 2 hrs until 0620. Report given at bedside to rapid response nurse. Pt escorted to neuro ICU in ICU bed with rapid response team.
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
## 2022-05-27 NOTE — HPI
Emilia Cotto is a 81 y.o. female with significant medical history of Afib (on Eliquis), HTN, hypothyroidism,  presented to hospital as a transfer from [facility] for evaluation of L MCA stroke.  HPI information gathered from review of the patient's medical record due to the patient being mute.  Patient was LKN around 1900.  She acutely developed right sided weakness and aphasia.  There were no reported preceding symptoms and nothing was reported to exacerbate or alleviate the symptoms. She presented to the [facility] ED where a CTH was obtained and was negative for acute findings.  Telestroke consult was performed by Dr. Sampson.  tPA was not administered due to the patient taking Eliquis.  A CTA head and neck was obtained and revealed an occlusion of the left M2 segment of the MCA.  The patient was transferred to [facility] for higher level of care, possible endovascular intervention.  On arrival to this facility the patient is awake.  She is mute but follows some commands.  Spontaneous movement noted in her LUE and LLE.  The patient withdraws to pain in the RLE, no movement noted in the RUE.  She was taken to CT for CTH.  Imaging reviewed by Nghia Lewis and Eran, jeana VAIL.  Patient to IR for endovascular intervention.  She will be admitted to [facility] post procedure.     OPERATIVE REPORT  PATIENT NAME: Katie Dickinson  :  1978  MRN: [id number]  Pt Location: [facility] OR ROOM 09    SURGERY DATE: 10/26/2017    Surgeon(s) and Role:     * Kitty Cartagena MD - Primary     * Maddi Weber MD - Assisting    Preop Diagnosis:  Hemorrhoids [K64 9]    Post-Op Diagnosis Codes:     * Hemorrhoids [K64 9]    Procedure:  Exam under anesthesia  Anoscopy  Internal hemorrhoidectomy/papilla excision x 1 column  Flexible sigmoidoscopy    Specimen(s):  ID Type Source Tests Collected by Time Destination   1 : INTERNAL HEMORRHOIDS Tissue Hemorrhoids TISSUE EXAM Kitty Cartagena MD 10/26/2017 3858        Estimated Blood Loss:   Minimal    Drains:   None    Anesthesia Type:   IV Sedation with Anesthesia    Operative Indications:  Hemorrhoids [K64 9]      Operative Findings:  Posterior midline irritated, prolapsing hemorrhoid versus papilla, excised  Flexible sigmoidoscopy to 50 cm revealed normal visualized mucosa without signs of inflammation or other lesion  Complications:   None    Procedure and Technique:  Julius Whittaker is a 46-year-old male who for the last few months had something prolapsing and going back into the anal canal after bowel movement, occasional bright red blood during bowel moved  Had colonoscopy 2013 normal   Had prolapsing posterior midline lesion suspect internal hemorrhoid with prolapse and surface irregularity and discussed anorectal surgery for removal and diagnosis especially with the surface irregularity rule out lesion,and risks including not limited to bleeding, infection, risks of anesthesia, damage to sphincter/incontinence, pain of hemorrhoidectomy, constipation/impaction/urinary retention  He understood these risks and wished to proceed  He was brought to the operating room where MAC anesthesia was induced without event  Venodynes were on and running throughout the procedure  He received no antibiotics as none were medically indicated   He was placed in the prone jackknife position with attention to joints, extremities and genitalia  Buttocks were taped apart, he was Betadine prepped  He was sterile prepped and draped  After appropriate timeout per protocol procedure began with examination under anesthesia, digital rectal examination normal, posterior midline irritated internal hemorrhoids/papilla as previous, Anoscopy revealed similar results with Adam bivalve anoscope, normal anorectal mucosa otherwise  The internal hemorrhoids/papilla was on a narrow stalk did not appear to involve the external hemorrhoidal complex so this was not excised, this was grasped with forceps and excised with needlepoint electrocautery at the base leaving a small defect which was oversewn with 3-0 chromic x3 in simple interrupted fashion, hemostatic  There was no sphincter fibers approached  Repeat digital examination and anoscopy revealed no stenosis, and good hemostasis  Flexible sigmoidoscopy to 50 cm revealed normal visualized mucosa without signs of inflammation or other lesion  There was no packing placed  4 x 4's and mesh underwear were placed and patient was rotated supine and awakened brought to the recovery room in stable addition  All sponge, needle, instrument counts were correct       I was present for the entire procedure    Patient Disposition:  PACU     SIGNATURE: Dru Kelley MD  DATE: October 26, 2017  TIME: 8:54 AM

## 2022-05-27 NOTE — PLAN OF CARE
Pt eval complete and OT POC established.    Problem: Occupational Therapy  Goal: Occupational Therapy Goal  Description: Goals set on 5/27/2022 with expiration date 6/10/2022:  Patient will increase functional independence with ADLs by performing:    Supine <> Sit with Mod A.   Feeding while seated EOB/bedside chair with  Mod A.   Grooming while seated at EOB with  Mod A.   UB Dressing with Mod A.   LB Dressing with Mod A.   Step transfer with  Mod A with DME as needed.  Pt will demonstrate understanding of education provided regarding energy conservation and task modification through teach-back method.   Patient and/or patient's family will verbalize understanding of POC and post d/c support needs, and personal risk factors for fall risk reduction.      Outcome: Ongoing, Progressing

## 2022-05-27 NOTE — CONSULTS
Inpatient consult to Physical Medicine Rehab  Consult performed by: Jeimy Salinas NP  Consult ordered by: Raza Harrell NP  Reason for consult: assess rehab needs      Reviewed patient history and current admission.  PM&R following at a distance for medical stability and progress with therapy as patient is in ICU.    BARBARA Dotson, FNP-C  Physical Medicine & Rehabilitation   05/27/2022

## 2022-05-27 NOTE — SUBJECTIVE & OBJECTIVE
Past Medical History:   Diagnosis Date    A-fib     A-fib     Anticoagulant long-term use     Hypertension     Thyroid disease      Past Surgical History:   Procedure Laterality Date    HYSTERECTOMY      THYROID SURGERY Right     lobectomy     Family History   Problem Relation Age of Onset    Diabetes Mother     Diabetes Sister      Social History     Tobacco Use    Smoking status: Former Smoker     Quit date: 1995     Years since quittin.9    Smokeless tobacco: Former User   Substance Use Topics    Alcohol use: Not Currently     Review of patient's allergies indicates:  No Known Allergies    Medications: I have reviewed the current medication administration record.    Current Outpatient Medications   Medication Instructions    apixaban (ELIQUIS) 5 mg, Oral, 2 times daily    digoxin (LANOXIN) 250 mcg, Oral, Daily    enalapril (VASOTEC) 10 mg, Oral, 2 times daily    hydroCHLOROthiazide (HYDRODIURIL) 25 mg, Oral, Daily    levothyroxine (SYNTHROID) 88 mcg, Oral, Before breakfast    metoprolol succinate (TOPROL-XL) 25 mg, Oral, Daily     -Meds need to be verified      Review of Systems   Constitutional:  Negative for fever.   HENT:  Negative for drooling.    Eyes:  Negative for discharge and redness.   Respiratory:  Negative for cough and shortness of breath.    Cardiovascular:  Negative for leg swelling.   Gastrointestinal:  Negative for diarrhea and vomiting.   Genitourinary:  Negative for hematuria.   Musculoskeletal:  Negative for neck stiffness.   Skin:  Negative for rash.   Neurological:  Positive for facial asymmetry, speech difficulty, weakness and numbness.   Objective:     Vital Signs (Most Recent):       Vital Signs Range (Last 24H):       Physical Exam  Vitals and nursing note reviewed.   Constitutional:       General: She is not in acute distress.     Appearance: She is well-developed. She is not diaphoretic.   HENT:      Head: Normocephalic and atraumatic.      Right Ear: External ear normal.       Left Ear: External ear normal.      Nose: Nose normal.      Mouth/Throat:      Mouth: Mucous membranes are moist.   Eyes:      General: No scleral icterus.        Right eye: No discharge.         Left eye: No discharge.      Extraocular Movements: Extraocular movements intact.      Conjunctiva/sclera: Conjunctivae normal.      Pupils: Pupils are equal, round, and reactive to light.   Cardiovascular:      Rate and Rhythm: Normal rate. Rhythm regularly irregular.   Pulmonary:      Effort: Pulmonary effort is normal. No respiratory distress.   Abdominal:      General: There is no distension.      Palpations: Abdomen is soft.      Tenderness: There is no abdominal tenderness.   Musculoskeletal:      Cervical back: Normal range of motion and neck supple.      Right lower leg: Edema (+1 pitting) present.      Left lower leg: Edema (+1 pitting) present.   Skin:     General: Skin is warm and dry.      Capillary Refill: Capillary refill takes less than 2 seconds.   Neurological:      Mental Status: She is alert.      Cranial Nerves: Cranial nerve deficit present.      Sensory: Sensory deficit present.      Motor: Weakness present.       Neurological Exam:   LOC: alert  Attention Span: Good   Language: Expressive aphasia  Articulation: Mute/Anarthric  Visual Fields: Hemianopsia right  EOM (CN III, IV, VI): Gaze preference  left  Pupils (CN II, III): PERRL  Facial Movement (CN VII): Lower facial weakness on the Right  Sensation: Margarito-anesthesia right      Laboratory:  CMP:   Recent Labs   Lab 05/27/22 0047   CALCIUM 9.3   ALBUMIN 4.0   PROT 7.3      K 4.1   CO2 22*      BUN 15   CREATININE 0.8   ALKPHOS 55   ALT 16   AST 35   BILITOT 0.7     CBC:   Recent Labs   Lab 05/27/22 0047 05/27/22 0047   WBC 10.01  --    RBC 4.55  --    HGB 12.2  --    HCT 38.5 40     --    MCV 85  --    MCH 26.8*  --    MCHC 31.7*  --      Lipid Panel:   Recent Labs   Lab 05/27/22 0047   CHOL 170   LDLCALC 114.0   HDL 41    TRIG 75     Coagulation:   Recent Labs   Lab 05/27/22 0047   INR 1.1     Hgb A1C: No results for input(s): HGBA1C in the last 168 hours.  TSH:   Recent Labs   Lab 05/27/22 0047   TSH 0.100*       Diagnostic Results:      Brain imaging:  CTH 5/27/2022 Impression: 1. No acute intracranial hemorrhage or other CT evidence of acute intracranial abnormality.  If there is high clinical concern for acute ischemia, further evaluation with MRI is advised if there are no clinical contraindications.  2. Abnormal asymmetric hyperdensity within the distal left M1/proximal M2 branch vessels which could relate to underlying thrombus/occlusion.  MRA or CTA can be performed for more definitive evaluation.  3. Remote right temporal occipital infarcts with compensatory dilatation of the posterior and temporal horn of the right lateral ventricle.    CTH 5/26/2022 obtained at OSH.  Negative for acute findings.    MRI Brain pending    Vessel Imaging:  CTA Head and Neck 5/26/2022 obtained at OSH. Proximal occlusion of the L M2 segment of the MCA.    Cerebral Angiogram pending    Cardiac Evaluation:   TTE pending

## 2022-05-27 NOTE — ASSESSMENT & PLAN NOTE
RSW, CTA with dense left MCA  Transferred for IR  Vasc neuro following   SBP < 180 until post IR  MRI tomorrow   PT/OT  Admit NCC, hourly neuro checks,

## 2022-05-27 NOTE — CONSULTS
[facility]  Vascular Neurology  Comprehensive Stroke Center  TeleVascular Neurology Acute Consultation Note      Consults    Consulting Provider: CHOCO HOWARD  Current Providers  No providers found    Patient Location: [facility] Emergency Department  Spoke hospital nurse at bedside with patient assisting consultant.     Patient information was obtained from patient.         Assessment/Plan:   Patient presenting with right sided extremity weakness. She is non-verbal. She has hx of stroke. LKW 1900. NIHSS-25. CT brain with old right temporal stroke and no acute intracranial process. Good ASPECT. Appears 10. She appears thrombectomy candidate.       TPA was not given as she is on Eliquis and per family takes it regularly  Patient will be transferred for thrombectomy evaluation to [facility]. I have updated IR team and stroke team at [facility].    Head of bed flat, IV Fluids, permissive hypertension  CTA head and neck with and without contrast with Proximal superior M2 occlusion on CTA.   Stroke/TIA work-up with MRI brain, Echo, PT/OT/ Speech and swallow evaluation.      Diagnoses:   Left MCA Stroke    STROKE DOCUMENTATION     Acute Stroke Times:   Acute Stroke Times   Last Known Normal Date: 05/26/22  Last Known Normal Time: 1900  Stroke Team Called Date: 05/26/22  Stroke Team Called Time: 2140  Stroke Team Arrival Date: 05/26/22  Stroke Team Arrival Time: 2149  CT completed but images not available for review - spoke to document results: 2155    NIH Scale:  1a. Level of Consciousness: 0-->Alert, keenly responsive  1b. LOC Questions: 2-->Answers neither question correctly  1c. LOC Commands: 0-->Performs both tasks correctly  2. Best Gaze: 2-->Forced deviation, or total gaze paresis not overcome by the oculocephalic maneuver  3. Visual: 1-->Partial hemianopia  4. Facial Palsy: 2-->Partial paralysis (total or near-total  paralysis of lower face)  5a. Motor Arm, Left: 3-->No effort against gravity, limb falls  5b. Motor Arm, Right: 3-->No effort against gravity, limb falls  6a. Motor Leg, Left: 2-->Some effort against gravity, leg falls to bed by 5 secs, but has some effort against gravity  6b. Motor Leg, Right: 3-->No effort against gravity, leg falls to bed immediately  7. Limb Ataxia: 0-->Absent  8. Sensory: 1-->Mild-to-moderate sensory loss, patient feels pinprick is less sharp or is dull on the affected side, or there is a loss of superficial pain with pinprick, but patient is aware of being touched  9. Best Language: 3-->Mute, global aphasia, no usable speech or auditory comprehension  10. Dysarthria: 2-->Severe dysarthria, patients speech is so slurred as to be unintelligible in the absence of or out of proportion to any dysphasia, or is mute/anarthric  11. Extinction and Inattention (formerly Neglect): 1-->Visual, tactile, auditory, spatial, or personal inattention or extinction to bilateral simultaneous stimulation in one of the sensory modalities  Total (NIH Stroke Scale): 25     Modified [place] Coma Scale:    ABCD2 Score:    [id number] Score:   HAS -BLED Score:   ICH Score:   Hunt & [place] Classification:       There were no vitals taken for this visit.  Alteplase Eligible?: Yes  Alteplase Recommendation: Alteplase not recommended due to Full dose anticoagulation   Possible Interventional Revascularization Candidate? Yes    Disposition Recommendation: transfer to [facility] by  air  stat    Subjective:     History of Present Illness:  Patient presenting with right sided extremity weakness. She is non-verbal. She has hx of stroke. LKW 1900.           Woke up with symptoms?: no    Recent bleeding noted: no  Does the patient take any Blood Thinners? yes  Medications: Anticoagulants:  apixaban/Eliquis      Past Medical History: hypertension, stroke and Afib    Past Surgical History: no major surgeries  within the last 2 weeks    Family History: no relevant history    Social History: unable to obtain    Allergies: Allergies have not been reviewed No relevant allergies    Review of Systems  Objective:   Vitals: There were no vitals taken for this visit. BP: 188/77    CT READ: Yes  No hemmorhage. No mass effect. No early infarct signs. Old right temporal stroke    Physical Exam          Recommended the emergency room physician to have a brief discussion with the patient and/or family if available regarding the  risks and benefits of treatment, and to briefly document the occurrence of that discussion in his clinical encounter note.     The attending portion of this evaluation, treatment, and documentation was performed per Jayne Sampson MD via audiovisual.    Billing code:  (moderate to severe stroke, large areas of edema, some mimics)    · This patient has a critical neurological condition/illness, with high morbidity and mortality.  · There is a high probability for acute neurological change leading to clinical and possibly life-threatening deterioration requiring highest level of physician preparedness for urgent intervention.  · Care was coordinated with other physicians involved in the patient's care.  · Radiologic studies and laboratory data were reviewed and interpreted, and plan of care was re-assessed based on the results.  · Diagnosis, treatment options and prognosis may have been discussed with the patient and/or family members or caregiver.  · Further advanced medical management and further evaluation is warranted for his care.      In your opinion, this was a: Tier 1 Van Positive    Consult End Time: 10:08 PM     Jayne Sampson MD  [facility] Stroke Center  Vascular Neurology   [facility]
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
## 2022-05-27 NOTE — HPI
Patient presenting with right sided extremity weakness. He is non-verbal. He has hx of stroke. LKW 1900.

## 2022-05-27 NOTE — CODE/ RAPID DOCUMENTATION
RAPID RESPONSE NURSE IR NOTE       Admit Date: 2022  LOS: 0  Code Status: Full Code   Date of Consult: 2022  : 1940  Age: 81 y.o.  Weight:   Wt Readings from Last 1 Encounters:   No data found for Wt     Sex: female  Race: Unknown   Bed: [address] [place] [address] A:   MRN: [id number]  Time Rapid Response Team notified: 0030  Time Rapid Response Team at bedside: 0330  Time Rapid Response Team left bedside: 0435  Was the patient discharged from an ICU this admission? No   Was the patient discharged from a PACU within last 24 hours? No   Did the patient receive conscious sedation/general anesthesia in last 24 hours? No  Was the patient in the ED within the past 24 hours? Yes  Was the patient on NIPPV within the past 24 hours? No   Did this progress into an ARC or CPA:  no  Attending Physician: Mahad Fox MD  Primary Service: Networked reference to record PCT     SITUATION  I  Reason for Call: IR thrombectomy    BACKGROUND    Why is the patient in the hospital?: <principal problem not specified>  Patient has a past medical history of A-fib, A-fib, Anticoagulant long-term use, Hypertension, and Thyroid disease.    ASSESSMENT    LKW: 1900 on 2022    IR arrival time: In room: 032  TICI Score: TICI Score: 2B  Hemostasis time: Hemostasis time: 420    Last VS: BP (!) 147/64   Pulse 60   Resp (!) 22   SpO2 98%   Breastfeeding No     24H Vital Sign Range:  Pulse:  []   Resp:  [22]   BP: (147-210)/(64-98)   SpO2:  [98 %-99 %]     BP parameters: TICI 2b - SBP <180 per verbal orders by Dr. Lynn    Bedrest: Closure device utilized. Patient must remain flat for 2 hours after Hemostasis time: 420.    FREQUENT DOCUMENTION    Upon initial assessment in IR suite, pt. Laying in ED stretcher with primary ED RN at bs. Pt. Awake, alert, not following commands, nonverbal, noted left-sided gaze, right-sided facial droop, and right-sided weakness (U/L). Spontaneously moving left extremities (U/L). Verbal report  received from ED RNDevin.     Post procedure, neurovascular checks and vitals completed per protocol. Pt. Able to follow simple commands with left arm but remains non verbal. Pt. Transported to [facility] 9090 on continuous portable cardiac monitor with RRN x2. Verbal bedside report given to DIVYA Crespo. No questions or concerns noted from receiving unit.     Family escorted to 9th floor family waiting room. Primary [facility] RN aware.     Post procedure VS, Neuro and groin site checks: Q15m x 1H, Q30min x 1H, then hourly    See flowsheets for further documentation.    PROVIDER ESCALATION    Vascular Neurology provider you spoke with: Dr. Lynn    FOLLOW-UP/DISPOSITION    Disposition: Tx in ICU bed 9090. Report given to: DIVYA Crespo.     Next post procedure documentation due at 1890.    Call the Rapid Response Nurse, Anel Bailey RN at [phone] for additional questions or concerns.

## 2022-05-27 NOTE — PT/OT/SLP PROGRESS
Speech Language Pathology      Emilia Cotto  MRN: [id number]    SLP Evaluation orders received and reviewed. Upon SLP attempt, MD in doorway and explained Patient not appropriate for assessment this service day. Patient not seen today secondary to MD hold. Will follow-up to re-attempt 5/28/22.    5/27/2022

## 2022-05-27 NOTE — SUBJECTIVE & OBJECTIVE
Woke up with symptoms?: no    Recent bleeding noted: no  Does the patient take any Blood Thinners? yes  Medications: Anticoagulants:  apixaban/Eliquis      Past Medical History: hypertension, stroke and Afib    Past Surgical History: no major surgeries within the last 2 weeks    Family History: no relevant history    Social History: unable to obtain    Allergies: Allergies have not been reviewed No relevant allergies    Review of Systems  Objective:   Vitals: There were no vitals taken for this visit. BP: 188/77    CT READ: Yes  No hemmorhage. No mass effect. No early infarct signs. Old right temporal stroke    Physical Exam

## 2022-05-27 NOTE — ED NOTES
I-STAT Chem-8+ Results:   Value Reference Range   Sodium 138 136-145 mmol/L   Potassium  4.2 3.5-5.1 mmol/L   Chloride 101  mmol/L   Ionized Calcium 1.12 1.06-1.42 mmol/L   CO2 (measured) 28 23-29 mmol/L   Glucose 139  mg/dL   BUN 20 6-30 mg/dL   Creatinine 0.7 0.5-1.4 mg/dL   Hematocrit 40 36-54%

## 2022-05-27 NOTE — PLAN OF CARE
[facility] - Neuro Critical Care  Initial Discharge Assessment       Primary Care Provider: Tay Young Jr, MD    Admission Diagnosis: CVA (cerebral vascular accident) [I63.9]  Stroke [I63.9]    Admission Date: 5/27/2022  Expected Discharge Date:     Discharge Barriers Identified: None    Payor: HU[employer] MANAGED MEDICARE / Plan: HU[employer] MEDICARE HMO / Product Type: Capitation /     Extended Emergency Contact Information  Primary Emergency Contact: Dorina Shook  Mobile Phone: [phone]  Relation: Daughter  Secondary Emergency Contact: Tata Guo  Mobile Phone: [phone]  Relation: Daughter    Discharge Plan A: Skilled Nursing Facility  Discharge Plan B: Rehab      [facility]  [address]  [facility] [address]  Phone: [phone] Fax: [phone]      [facility] from:  [facility]    Past Medical History:   Diagnosis Date    A-fib     A-fib     Anticoagulant long-term use     Hypertension     Thyroid disease          CM met with patient and Tata Guo (daughter) [phone] in room for Discharge Planning Assessment.  Patient is unable to answer questions.  Per daughter, the patient lives with 3 grandchildren in a single story house with 0 step(s) to enter.   Per daughter, the patient was independent with ADLS and used a rolling walker prn for ambulation.  Patient will have assistance from her daughter, Tata, and will discharge to Tata's home if able.  The address is [address] [place] [address].  [id number].  The home is a single story house with 3 steps to enter with no rail.    Discharge Planning Booklet given to patient/family and discussed.  All questions addressed.  CM will follow for needs.          Initial Assessment (most recent)     Adult Discharge Assessment - 05/27/22 1502        Discharge Assessment    Assessment Type Discharge Planning Assessment     Confirmed/corrected address, phone number and insurance Yes     Confirmed Demographics Correct on Facesheet      Source of Information unable to respond     If unable to respond/provide information was family/caregiver contacted? Yes     Contact Name/Number Tata Guo (dtr)     Communicated JOE with patient/caregiver Date not available/Unable to determine     Reason For Admission Left MCA stroke     Lives With grandchild(altagracia)     Facility Arrived From: [facility]     Do you expect to return to your current living situation? Other (see comments)   narendra    Do you have help at home or someone to help you manage your care at home? Yes     Who are your caregiver(s) and their phone number(s)? Tata Guo (daughter) [phone]     Prior to hospitilization cognitive status: Alert/Oriented     Current cognitive status: Unable to Assess     Walking or Climbing Stairs Difficulty none     Dressing/Bathing Difficulty none     Home Layout Able to live on 1st floor     Equipment Currently Used at Home walker, rolling     Readmission within 30 days? No     Patient currently being followed by outpatient case management? No     Do you currently have service(s) that help you manage your care at home? No     Do you take prescription medications? Yes     Do you have prescription coverage? Yes     Coverage [employer]     Do you have any problems affording any of your prescribed medications? No     Is the patient taking medications as prescribed? yes     Who is going to help you get home at discharge? Tata Guo (daughter) [phone]     How do you get to doctors appointments? family or friend will provide     Are you on dialysis? No     Do you take coumadin? No     Discharge Plan A Skilled Nursing Facility     Discharge Plan B Rehab     DME Needed Upon Discharge  other (see comments)   tbd    Discharge Plan discussed with: Adult children     Discharge Barriers Identified None                  Lisbeth Rodney RN, CCRN-K, [facility]  Neuro-Critical Care   [phone]

## 2022-05-27 NOTE — PLAN OF CARE
[facility] Care Plan    POC reviewed with Emilia Cotto and family at 0300. Pt non-verbal; family at bedside. Questions and concerns addressed. No acute events since procedure Will continue to monitor. See below and flowsheets for full assessment and VS info.           Is this a stroke patient? Yes; strokebook at bedside    Neuro:  [place] Coma Scale  Best Eye Response: 3-->(E3) to speech  Best Motor Response: 6-->(M6) obeys commands  Best Verbal Response: 1-->(V1) none  [place] Coma Scale Score: 10  Assessment Qualifiers: patient not sedated/intubated  Pupil PERRLA: yes     24hr Temp:  [97.8 °F (36.6 °C)]     CV:   Rhythm: atrial rhythm  BP goals:   SBP < 140  MAP > 65    Resp:   O2 Device (Oxygen Therapy): room air       Plan: N/A    GI/:           Voiding Characteristics: external catheter    Intake/Output Summary (Last 24 hours) at 5/27/2022 0733  Last data filed at 5/27/2022 0600  Gross per 24 hour   Intake 34.55 ml   Output --   Net 34.55 ml          Labs/Accuchecks:  Recent Labs   Lab 05/27/22 0047 05/27/22 0047   WBC 10.01  --    RBC 4.55  --    HGB 12.2  --    HCT 38.5 40     --       Recent Labs   Lab 05/27/22  0508      K 3.3*   CO2 22*      BUN 14   CREATININE 0.7   ALKPHOS 57   ALT 12   AST 22   BILITOT 0.7      Recent Labs   Lab 05/27/22 0047   INR 1.1    No results for input(s): CPK, CPKMB, TROPONINI, MB in the last 168 hours.    Electrolytes: N/A - electrolytes WDL  Accuchecks: Q6H    Gtts:   sodium chloride 0.9% 75 mL/hr at 05/27/22 0531    nicardipine         LDA/Wounds:  Lines/Drains/Airways       Peripheral Intravenous Line  Duration                  Peripheral IV - Single Lumen 05/27/22 0043 18 G Right Antecubital <1 day         Peripheral IV - Single Lumen 05/27/22 0044 20 G Left Wrist <1 day                  Wounds: No  Wound care consulted: No

## 2022-05-27 NOTE — PT/OT/SLP EVAL
Occupational Therapy   Co-Evaluation and Treatment with PT  Co-treat with PT due to medical complexity of pt and need for skilled hands for safe intervention.    Name: Emilia Cotto  MRN: [id number]  Admitting Diagnosis:  <principal problem not specified>    Length of Stay: 0 days    Recommendations:     Discharge Recommendations: rehabilitation facility (pending progress)  Discharge Equipment Recommendations:   (TBD)  Barriers to discharge:  Other (Comment) (Increased skilled A required)    Plan:     Patient to be seen 4 x/week to address the above listed problems via self-care/home management, therapeutic activities, therapeutic exercises, neuromuscular re-education, cognitive retraining  · Plan of Care Expires: 06/25/22  · Plan of Care Reviewed with: patient, family    Assessment:     Emilia Cotto is a 81 y.o. female with a medical diagnosis of <principal problem not specified>.  She presents with the following performance deficits affecting function: weakness, impaired endurance, impaired sensation, impaired self care skills, impaired functional mobilty, gait instability, impaired balance, impaired cognition, decreased coordination, decreased upper extremity function, decreased lower extremity function, decreased safety awareness, impaired coordination, decreased ROM, abnormal tone.      Pt recommended to d/c to Rehab for continued improvement in deficit areas and ADLs/functional mobility for increased functional independence and OQL prior to retrun to home environment. Pt tolerated session fairly. Pt able to participate in UB dressing, toileting, and facial hygiene with Total A at this time due to urinary incontinence during standing trial and pt vomiting on self while seated at EOB. Pt was returned to [facility] for cleaning and BP checked while seated at EOB with SBP of 136.       Rehab Prognosis: Good; patient would benefit from acute skilled OT services to address these deficits and reach maximum level of  "function.       Subjective   Communicated with: RN prior to session.  Patient found HOB elevated with telemetry, blood pressure cuff, pulse ox (continuous), PureWick, restraints, peripheral IV upon OT entry to room.    Chief Complaint: Transfer ([facility] stroke transfer - no TPA given, pt on eliquis for afib. IR candidate )    Patient/Family Comments/goals: "She was very independent before this!"    Pain/Comfort:  · Pain Rating 1:  (unable to assess due to pt nonverbal and no indication of pain)    Patients cultural, spiritual, Judaism conflicts given the current situation: no    Occupational Profile:  Living Environment: Pt lives with 3 nephews and a niece in a single story home with threshold step to enter and t/s combo with no grab bars or seat. Per daughter, pt will d/c to stay with her in H with 3 steps with no HR (pplanning installation), and tub shower combo with no grab bars or seat.  Prior Level of Function: Patient reports being Independent (using RW rarely) with mobility & with ADLs.   Patient uses DME as follows: walker, rolling.   DME owned (not currently used): bedside commode.  Roles/Repsonsibilities:   Hand Dominance: right   Work: no.   Drive: short distances.   Managing Medicines/Managing Home: yes.   Hobbies: Reading her Bible.  Equipment Used at Home:  walker, rolling    Patient reports they will have assistance from family 24/7 upon discharge.       Objective:     Patient found with: telemetry, blood pressure cuff, pulse ox (continuous), PureWick, restraints, peripheral IV   General Precautions: Standard, Cardiac fall   Orthopedic Precautions:N/A   Braces: N/A   Respiratory Status:    Room air  Vitals: BP (!) 124/58   Pulse 68   Temp 98.5 °F (36.9 °C) (Axillary)   Resp (!) 29   Ht 5' 5" (1.651 m)   Wt 58.5 kg (129 lb)   SpO2 99%   Breastfeeding No   BMI 21.47 kg/m²     Cognitive and Psychosocial Function:   · AxOx0 -- Unable to assess due to pt non-verbal   · Follows " Commands/attention:0% command following at this time and no visual tracking noted  · Communication:  Nonverbal throughout session.  · Memory: KANE at this time  · Safety awareness/insight to disability: impaired   · Mood/Affect/Coping skills/emotional control: Flat affect and Lethargic    Hearing: Intact    Vision:  Right neglect    Physical Exam:  Postural examination/scapula alignment:    -       Rounded shoulders  -       Forward head  Skin integrity: Visible skin intact     Left UE Right UE   UE Edema absent absent   UE ROM AAROM WFL AAROM WFL   UE Strength 2/5 limited due to unable to follow commands 2/5 limited due to unable to follow commands    Strength fair composite grasp fair composite grasp   Sensation LUE INTACT:withdraws to pain in BUE RUE INTACT: withdraws to pain in BUE   Fine Motor Coordination:  LUE IMPAIRED: unable to follow commands to assess RUE IMPAIRED: unable to follow commands to assess   Gross Motor Coordination: LUE IMPAIRED: KANE due to poor command following RUE IMPAIRED:KANE due to poor command following     Occupational Performance:  Bed Mobility:    · Patient completed Rolling/Turning to Right with maximal assistance  · Scooting to HOB in supine: total assistance and 2 persons  · Patient completed Supine to Sit with total assistance on R side of bed  · Scooting anteriorly to EOB to have both feet planted on floor: total assistance and 2 persons  · Patient completed Sit to Supine with total assistance and 2 persons on R side of bed    Functional Mobility/Transfers:   Static Sitting EOB: CGA intially and progressed to SBA   Dynamic Sitting EOB: Min A to engage in ROM/MMT assessment and to engage in facial hygiene while seated at EOB.   Patient completed Sit <> Stand Transfer with moderate assistance and of 2 persons  with  hand-held assist    Static Standing Balance: Max A x 2 with HA   Dynamic Standing Balance: KANE      Activities of Daily Living:  · Feeding:  total assistance  via NG tube feeding.  · Grooming: total assistance to engage in facial hygiene while seated at EOB and to clean BLE while supine in bed after urinary incontinence.  · Upper Body Dressing: total assistance to doff/don gown while supine in to bed due to vomitting on gown while seated at EOB.  · Lower Body Dressing: total assistance to don and doff BLE socks while seated at EOB due to urinary incontinence in standing.  · Toileting: total assistance via PureWick and pt had urinary incontinence while in standing and required total A for post and ant gerardo hygiene while supine in bed and while at EOB.      [facility] 6 Click ADL:  AMPA Total Score: 6    Treatment & Education:  -OT POC, safety during ADLs and mobility   -Education on energy conservation and task modification to maximize safety and independence  -Questions answered within OT scope of practice.      Patient left HOB elevated with all lines intact, call button in reach, bed alarm on, RN notified and RN and family present    GOALS:   Multidisciplinary Problems     Occupational Therapy Goals        Problem: Occupational Therapy    Goal Priority Disciplines Outcome Interventions   Occupational Therapy Goal     OT, PT/OT Ongoing, Progressing    Description: Goals set on 5/27/2022 with expiration date 6/10/2022:  Patient will increase functional independence with ADLs by performing:    Supine <> Sit with Mod A.   Feeding while seated EOB/bedside chair with  Mod A.   Grooming while seated at EOB with  Mod A.   UB Dressing with Mod A.   LB Dressing with Mod A.   Step transfer with  Mod A with DME as needed.  Pt will demonstrate understanding of education provided regarding energy conservation and task modification through teach-back method.   Patient and/or patient's family will verbalize understanding of POC and post d/c support needs, and personal risk factors for fall risk reduction.                       History:     Past Medical History:   Diagnosis Date    A-fib      A-fib     Anticoagulant long-term use     Hypertension     Thyroid disease        Past Surgical History:   Procedure Laterality Date    HYSTERECTOMY      THYROID SURGERY Right     lobectomy       Time Tracking:       OT Date of Treatment: 05/27/22  OT Start Time: 1045  OT Stop Time: 1119  OT Total Time (min): 34 min  Additional staff present: RN and PT      Billable Minutes:Evaluation 10 min  Self Care/Home Management 24 min      5/27/2022

## 2022-05-28 LAB
ALBUMIN SERPL BCP-MCNC: 3.8 G/DL (ref 3.5–5.2)
ALLENS TEST: ABNORMAL
ALP SERPL-CCNC: 56 U/L (ref 55–135)
ALT SERPL W/O P-5'-P-CCNC: 13 U/L (ref 10–44)
ANION GAP SERPL CALC-SCNC: 10 MMOL/L (ref 8–16)
AST SERPL-CCNC: 29 U/L (ref 10–40)
BASOPHILS # BLD AUTO: 0.02 K/UL (ref 0–0.2)
BASOPHILS NFR BLD: 0.1 % (ref 0–1.9)
BILIRUB SERPL-MCNC: 1.2 MG/DL (ref 0.1–1)
BILIRUB UR QL STRIP: NEGATIVE
BUN SERPL-MCNC: 18 MG/DL (ref 8–23)
CALCIUM SERPL-MCNC: 9.6 MG/DL (ref 8.7–10.5)
CHLORIDE SERPL-SCNC: 105 MMOL/L (ref 95–110)
CLARITY UR REFRACT.AUTO: CLEAR
CO2 SERPL-SCNC: 25 MMOL/L (ref 23–29)
COLOR UR AUTO: YELLOW
CREAT SERPL-MCNC: 0.8 MG/DL (ref 0.5–1.4)
DELSYS: ABNORMAL
DIFFERENTIAL METHOD: ABNORMAL
EOSINOPHIL # BLD AUTO: 0 K/UL (ref 0–0.5)
EOSINOPHIL NFR BLD: 0 % (ref 0–8)
ERYTHROCYTE [DISTWIDTH] IN BLOOD BY AUTOMATED COUNT: 13.9 % (ref 11.5–14.5)
EST. GFR  (AFRICAN AMERICAN): >60 ML/MIN/1.73 M^2
EST. GFR  (NON AFRICAN AMERICAN): >60 ML/MIN/1.73 M^2
FLOW: 5
GLUCOSE SERPL-MCNC: 123 MG/DL (ref 70–110)
GLUCOSE UR QL STRIP: ABNORMAL
HCO3 UR-SCNC: 28.8 MMOL/L (ref 24–28)
HCT VFR BLD AUTO: 37.7 % (ref 37–48.5)
HGB BLD-MCNC: 12.2 G/DL (ref 12–16)
HGB UR QL STRIP: NEGATIVE
IMM GRANULOCYTES # BLD AUTO: 0.11 K/UL (ref 0–0.04)
IMM GRANULOCYTES NFR BLD AUTO: 0.6 % (ref 0–0.5)
KETONES UR QL STRIP: NEGATIVE
LEUKOCYTE ESTERASE UR QL STRIP: NEGATIVE
LYMPHOCYTES # BLD AUTO: 0.6 K/UL (ref 1–4.8)
LYMPHOCYTES NFR BLD: 3.2 % (ref 18–48)
MAGNESIUM SERPL-MCNC: 2.4 MG/DL (ref 1.6–2.6)
MCH RBC QN AUTO: 26.3 PG (ref 27–31)
MCHC RBC AUTO-ENTMCNC: 32.4 G/DL (ref 32–36)
MCV RBC AUTO: 81 FL (ref 82–98)
MODE: ABNORMAL
MONOCYTES # BLD AUTO: 1.4 K/UL (ref 0.3–1)
MONOCYTES NFR BLD: 7.4 % (ref 4–15)
NEUTROPHILS # BLD AUTO: 16.3 K/UL (ref 1.8–7.7)
NEUTROPHILS NFR BLD: 88.7 % (ref 38–73)
NITRITE UR QL STRIP: NEGATIVE
NRBC BLD-RTO: 0 /100 WBC
PCO2 BLDA: 36.7 MMHG (ref 35–45)
PH SMN: 7.5 [PH] (ref 7.35–7.45)
PH UR STRIP: 5 [PH] (ref 5–8)
PHOSPHATE SERPL-MCNC: 2.8 MG/DL (ref 2.7–4.5)
PLATELET # BLD AUTO: 326 K/UL (ref 150–450)
PMV BLD AUTO: 11 FL (ref 9.2–12.9)
PO2 BLDA: 52 MMHG (ref 80–100)
POC BE: 6 MMOL/L
POC SATURATED O2: 89 % (ref 95–100)
POC TCO2: 30 MMOL/L (ref 23–27)
POCT GLUCOSE: 125 MG/DL (ref 70–110)
POCT GLUCOSE: 139 MG/DL (ref 70–110)
POCT GLUCOSE: 154 MG/DL (ref 70–110)
POTASSIUM SERPL-SCNC: 4.1 MMOL/L (ref 3.5–5.1)
PROT SERPL-MCNC: 6.6 G/DL (ref 6–8.4)
PROT UR QL STRIP: NEGATIVE
RBC # BLD AUTO: 4.63 M/UL (ref 4–5.4)
SAMPLE: ABNORMAL
SITE: ABNORMAL
SODIUM SERPL-SCNC: 140 MMOL/L (ref 136–145)
SP GR UR STRIP: 1.02 (ref 1–1.03)
URN SPEC COLLECT METH UR: ABNORMAL
WBC # BLD AUTO: 18.41 K/UL (ref 3.9–12.7)

## 2022-05-28 PROCEDURE — 31720 CLEARANCE OF AIRWAYS: CPT

## 2022-05-28 PROCEDURE — 25000242 PHARM REV CODE 250 ALT 637 W/ HCPCS: Performed by: NURSE PRACTITIONER

## 2022-05-28 PROCEDURE — 99291 PR CRITICAL CARE, E/M 30-74 MINUTES: ICD-10-PCS | Mod: ,,, | Performed by: PSYCHIATRY & NEUROLOGY

## 2022-05-28 PROCEDURE — 92610 EVALUATE SWALLOWING FUNCTION: CPT

## 2022-05-28 PROCEDURE — 25000003 PHARM REV CODE 250: Performed by: PSYCHIATRY & NEUROLOGY

## 2022-05-28 PROCEDURE — 99900035 HC TECH TIME PER 15 MIN (STAT)

## 2022-05-28 PROCEDURE — 63600175 PHARM REV CODE 636 W HCPCS: Performed by: NURSE PRACTITIONER

## 2022-05-28 PROCEDURE — 99233 SBSQ HOSP IP/OBS HIGH 50: CPT | Mod: ,,, | Performed by: PSYCHIATRY & NEUROLOGY

## 2022-05-28 PROCEDURE — 87040 BLOOD CULTURE FOR BACTERIA: CPT | Mod: 59 | Performed by: NURSE PRACTITIONER

## 2022-05-28 PROCEDURE — 36600 WITHDRAWAL OF ARTERIAL BLOOD: CPT

## 2022-05-28 PROCEDURE — 93010 ELECTROCARDIOGRAM REPORT: CPT | Mod: ,,, | Performed by: INTERNAL MEDICINE

## 2022-05-28 PROCEDURE — 80053 COMPREHEN METABOLIC PANEL: CPT | Performed by: NURSE PRACTITIONER

## 2022-05-28 PROCEDURE — 93010 EKG 12-LEAD: ICD-10-PCS | Mod: ,,, | Performed by: INTERNAL MEDICINE

## 2022-05-28 PROCEDURE — 95720 EEG PHY/QHP EA INCR W/VEEG: CPT | Mod: ,,, | Performed by: PSYCHIATRY & NEUROLOGY

## 2022-05-28 PROCEDURE — 25000003 PHARM REV CODE 250: Performed by: NURSE PRACTITIONER

## 2022-05-28 PROCEDURE — 94640 AIRWAY INHALATION TREATMENT: CPT

## 2022-05-28 PROCEDURE — 81003 URINALYSIS AUTO W/O SCOPE: CPT | Performed by: NURSE PRACTITIONER

## 2022-05-28 PROCEDURE — 93005 ELECTROCARDIOGRAM TRACING: CPT

## 2022-05-28 PROCEDURE — 95720 PR EEG, W/VIDEO, CONT RECORD, I&R, >12<26 HRS: ICD-10-PCS | Mod: ,,, | Performed by: PSYCHIATRY & NEUROLOGY

## 2022-05-28 PROCEDURE — 25000003 PHARM REV CODE 250: Performed by: STUDENT IN AN ORGANIZED HEALTH CARE EDUCATION/TRAINING PROGRAM

## 2022-05-28 PROCEDURE — 99233 PR SUBSEQUENT HOSPITAL CARE,LEVL III: ICD-10-PCS | Mod: ,,, | Performed by: PSYCHIATRY & NEUROLOGY

## 2022-05-28 PROCEDURE — 95700 EEG CONT REC W/VID EEG TECH: CPT

## 2022-05-28 PROCEDURE — 84100 ASSAY OF PHOSPHORUS: CPT | Performed by: NURSE PRACTITIONER

## 2022-05-28 PROCEDURE — 99291 CRITICAL CARE FIRST HOUR: CPT | Mod: ,,, | Performed by: PSYCHIATRY & NEUROLOGY

## 2022-05-28 PROCEDURE — 95714 VEEG EA 12-26 HR UNMNTR: CPT

## 2022-05-28 PROCEDURE — 27000221 HC OXYGEN, UP TO 24 HOURS

## 2022-05-28 PROCEDURE — 63600175 PHARM REV CODE 636 W HCPCS: Performed by: STUDENT IN AN ORGANIZED HEALTH CARE EDUCATION/TRAINING PROGRAM

## 2022-05-28 PROCEDURE — 94761 N-INVAS EAR/PLS OXIMETRY MLT: CPT

## 2022-05-28 PROCEDURE — 99900026 HC AIRWAY MAINTENANCE (STAT)

## 2022-05-28 PROCEDURE — 83735 ASSAY OF MAGNESIUM: CPT | Performed by: NURSE PRACTITIONER

## 2022-05-28 PROCEDURE — 85025 COMPLETE CBC W/AUTO DIFF WBC: CPT | Performed by: NURSE PRACTITIONER

## 2022-05-28 PROCEDURE — 82803 BLOOD GASES ANY COMBINATION: CPT

## 2022-05-28 PROCEDURE — 20000000 HC ICU ROOM

## 2022-05-28 RX ORDER — SODIUM CHLORIDE FOR INHALATION 3 %
4 VIAL, NEBULIZER (ML) INHALATION EVERY 6 HOURS
Status: DISCONTINUED | OUTPATIENT
Start: 2022-05-28 | End: 2022-05-30

## 2022-05-28 RX ORDER — SODIUM CHLORIDE FOR INHALATION 3 %
4 VIAL, NEBULIZER (ML) INHALATION EVERY 4 HOURS
Status: DISCONTINUED | OUTPATIENT
Start: 2022-05-28 | End: 2022-05-28

## 2022-05-28 RX ORDER — VANCOMYCIN HCL IN 5 % DEXTROSE 1G/250ML
1000 PLASTIC BAG, INJECTION (ML) INTRAVENOUS
Status: DISCONTINUED | OUTPATIENT
Start: 2022-05-29 | End: 2022-05-30

## 2022-05-28 RX ORDER — IPRATROPIUM BROMIDE AND ALBUTEROL SULFATE 2.5; .5 MG/3ML; MG/3ML
3 SOLUTION RESPIRATORY (INHALATION) EVERY 4 HOURS PRN
Status: DISCONTINUED | OUTPATIENT
Start: 2022-05-28 | End: 2022-05-30

## 2022-05-28 RX ADMIN — IPRATROPIUM BROMIDE AND ALBUTEROL SULFATE 3 ML: 2.5; .5 SOLUTION RESPIRATORY (INHALATION) at 04:05

## 2022-05-28 RX ADMIN — SODIUM CHLORIDE SOLN NEBU 3% 4 ML: 3 NEBU SOLN at 08:05

## 2022-05-28 RX ADMIN — IPRATROPIUM BROMIDE AND ALBUTEROL SULFATE 3 ML: 2.5; .5 SOLUTION RESPIRATORY (INHALATION) at 07:05

## 2022-05-28 RX ADMIN — ATORVASTATIN CALCIUM 40 MG: 20 TABLET, FILM COATED ORAL at 08:05

## 2022-05-28 RX ADMIN — CEFTRIAXONE 1 G: 1 INJECTION, SOLUTION INTRAVENOUS at 10:05

## 2022-05-28 RX ADMIN — MUPIROCIN: 20 OINTMENT TOPICAL at 10:05

## 2022-05-28 RX ADMIN — PIPERACILLIN SODIUM AND TAZOBACTAM SODIUM 4.5 G: 4; .5 INJECTION, POWDER, LYOPHILIZED, FOR SOLUTION INTRAVENOUS at 08:05

## 2022-05-28 RX ADMIN — METOPROLOL TARTRATE 12.5 MG: 25 TABLET, FILM COATED ORAL at 08:05

## 2022-05-28 RX ADMIN — SODIUM CHLORIDE SOLN NEBU 3% 4 ML: 3 NEBU SOLN at 01:05

## 2022-05-28 RX ADMIN — AZITHROMYCIN 500 MG: 500 INJECTION, POWDER, LYOPHILIZED, FOR SOLUTION INTRAVENOUS at 11:05

## 2022-05-28 RX ADMIN — LEVOTHYROXINE SODIUM 88 MCG: 88 TABLET ORAL at 06:05

## 2022-05-28 RX ADMIN — VANCOMYCIN HYDROCHLORIDE 1500 MG: 1.5 INJECTION, POWDER, LYOPHILIZED, FOR SOLUTION INTRAVENOUS at 07:05

## 2022-05-28 RX ADMIN — SODIUM CHLORIDE SOLN NEBU 3% 4 ML: 3 NEBU SOLN at 07:05

## 2022-05-28 RX ADMIN — SODIUM CHLORIDE SOLN NEBU 3% 4 ML: 3 NEBU SOLN at 04:05

## 2022-05-28 RX ADMIN — SODIUM CHLORIDE 500 ML: 0.9 INJECTION, SOLUTION INTRAVENOUS at 07:05

## 2022-05-28 RX ADMIN — DIGOXIN 0.25 MG: 125 TABLET ORAL at 08:05

## 2022-05-28 RX ADMIN — MUPIROCIN: 20 OINTMENT TOPICAL at 08:05

## 2022-05-28 RX ADMIN — HYDRALAZINE HYDROCHLORIDE 10 MG: 20 INJECTION, SOLUTION INTRAMUSCULAR; INTRAVENOUS at 08:05

## 2022-05-28 RX ADMIN — ACETAMINOPHEN 650 MG: 325 TABLET ORAL at 03:05

## 2022-05-28 NOTE — ASSESSMENT & PLAN NOTE
-Small area of cytotoxic cerebral edema identified when reviewing brain imaging in the territory of the L middle cerebral artery. There is no mass effect associated with it. We will continue to monitor the patients clinical exam with Q4h neuro checks while on the floor, more frequently while in neuro critical care, for any worsening of symptoms which may indicate expansion of the insult and/or area of the edema resulting in clinical change that may require acute intervention to prevent loss of function and/or death. The pattern is suggestive of embolic etiology.    Continue to monitor for acute neuro exam changes, CTH PRN for exam changes.

## 2022-05-28 NOTE — PROGRESS NOTES
Pharmacokinetic Initial Assessment: IV Vancomycin    Assessment/Plan:    Initiate intravenous vancomycin with loading dose of 1500 mg once followed by a maintenance dose of vancomycin 1000 mg IV every 24 hours.   Desired empiric serum trough concentration is 15 to 20 mcg/mL.  Draw vancomycin trough level 60 min prior to third dose on 5/30/22 at approximately 1730.  Pharmacy will continue to follow and monitor vancomycin.      Please contact pharmacy at extension [phone] with any questions regarding this assessment.     Thank you for the consult,   Farrukh Joshua       Patient brief summary:  Emilia Cotto is a 81 y.o. female initiated on antimicrobial therapy with IV Vancomycin for treatment of suspected bacteremia.    Drug Allergies:   Review of patient's allergies indicates:  No Known Allergies    Actual Body Weight:   58.5 kg    Renal Function:   Estimated Creatinine Clearance: 51.3 mL/min (based on SCr of 0.8 mg/dL).,     Dialysis Method (if applicable):  N/A    CBC (last 72 hours):  Recent Labs   Lab Result Units 05/27/22 0047 05/27/22  0508 05/28/22  0330   WBC K/uL 10.01 12.49 18.41*   Hemoglobin g/dL 12.2 12.0 12.2   Hemoglobin A1C %  --  5.0  --    Hematocrit % 38.5 38.5 37.7   Platelets K/uL 278 272 326   Gran % % 91.8* 93.1* 88.7*   Lymph % % 4.2* 2.8* 3.2*   Mono % % 3.3* 3.5* 7.4   Eosinophil % % 0.1 0.0 0.0   Basophil % % 0.3 0.2 0.1   Differential Method  Automated Automated Automated       Metabolic Panel (last 72 hours):  Recent Labs   Lab Result Units 05/27/22  0047 05/27/22  0508 05/28/22  0330 05/28/22  1035   Sodium mmol/L 138 137 140  --    Potassium mmol/L 4.1 3.3* 4.1  --    Chloride mmol/L 103 104 105  --    CO2 mmol/L 22* 22* 25  --    Glucose mg/dL 138* 157* 123*  --    Glucose, UA   --   --   --  1+*   BUN mg/dL 15 14 18  --    Creatinine mg/dL 0.8 0.7 0.8  --    Albumin g/dL 4.0 3.6 3.8  --    Total Bilirubin mg/dL 0.7 0.7 1.2*  --    Alkaline Phosphatase U/L 55 57 56  --    AST U/L 35 22  29  --    ALT U/L 16 12 13  --    Magnesium mg/dL  --  1.8 2.4  --    Phosphorus mg/dL  --  2.7 2.8  --        Drug levels (last 3 results):  No results for input(s): VANCOMYCINRA, VANCORANDOM, VANCOMYCINPE, VANCOPEAK, VANCOMYCINTR, VANCOTROUGH in the last 72 hours.    Microbiologic Results:  Microbiology Results (last 7 days)     Procedure Component Value Units Date/Time    Blood culture [[id number]] Collected: 05/28/22 1537    Order Status: Sent Specimen: Blood from Peripheral, Antecubital, Right Updated: 05/28/22 1546    Blood culture [[id number]] Collected: 05/28/22 1540    Order Status: Sent Specimen: Blood from Peripheral, Antecubital, Left Updated: 05/28/22 1546

## 2022-05-28 NOTE — SUBJECTIVE & OBJECTIVE
Neurologic Chief Complaint: RSW, aphasia, L gaze    Subjective:     Interval History: Patient is seen for follow-up neurological assessment and treatment recommendations:     Patient's family agreed on DNR/DNI at this time. If patient's respiratory status should decline, will likely be transitioned to comfort care. At this time patient is stable and will continue to undergo evaluation and treatment for recent infarct. NCC started azithromycin and ceftriaxone for lower respiratory infections. Patient will audible rales at bedside, could be heard without auscultation. Cardene d/c at 9am today. TTE with no observed AF, severe LAE, moderate RUDDY, pulm HTN, and EF of 65%. Therapy recommending IPR. EEG being performed, cap in place during rounds today to evaluate for seizure activity. Patient drowsier today than yesterday. Not following commands. Withdraws on R and localizes pain on L. NPO per SLP. Continue current NCC care at this time.     HPI, Past Medical, Family, and Social History remains the same as documented in the initial encounter.     Review of Systems   Unable to perform ROS: Acuity of condition (Nonverbal)   Scheduled Meds:   atorvastatin  40 mg Per NG tube Daily    azithromycin  500 mg Intravenous Q24H    cefTRIAXone (ROCEPHIN) IVPB  1 g Intravenous Q24H    digoxin  0.25 mg Per NG tube Daily    levothyroxine  88 mcg Per NG tube Before breakfast    metoprolol tartrate  12.5 mg Per NG tube BID    mupirocin   Nasal BID    senna-docusate 8.6-50 mg  1 tablet Per NG tube BID    sodium chloride 3%  4 mL Nebulization Q6H     Continuous Infusions:  PRN Meds:acetaminophen, albuterol-ipratropium, hydrALAZINE, labetaloL, magnesium oxide, magnesium oxide, ondansetron, potassium bicarbonate, potassium bicarbonate, potassium bicarbonate, potassium, sodium phosphates, potassium, sodium phosphates, potassium, sodium phosphates, sodium chloride 0.9%    Objective:     Vital Signs (Most Recent):  Temp: 99.1 °F (37.3 °C)  (05/28/22 1200)  Pulse: 95 (05/28/22 1336)  Resp: (!) 22 (05/28/22 1336)  BP: (!) 140/62 (05/28/22 1300)  SpO2: (!) 93 % (05/28/22 1336)  BP Location: Left arm    Vital Signs Range (Last 24H):  Temp:  [98 °F (36.7 °C)-99.2 °F (37.3 °C)]   Pulse:  []   Resp:  [18-45]   BP: (114-142)/(56-65)   SpO2:  [91 %-100 %]   BP Location: Left arm    Physical Exam  Vitals and nursing note reviewed.   Constitutional:       Appearance: She is not diaphoretic.   HENT:      Head:      Comments: EEG cap in place during evaluation today      Nose: No rhinorrhea.   Eyes:      General: No scleral icterus.  Cardiovascular:      Rate and Rhythm: Normal rate.   Pulmonary:      Effort: Pulmonary effort is normal. No respiratory distress.   Musculoskeletal:         General: No deformity.   Skin:     General: Skin is warm and dry.   Neurological:      Mental Status: She is lethargic.      Motor: Weakness present.       Neurological Exam:   LOC: obtunded  Attention Span: unable to test   Language: Mute  Articulation: Mute/Anarthric  Orientation: Untestable due to severe aphasia   Visual Fields: Not blinking to threat  Pupils (CN II, III): PERRL  Facial Movement (CN VII): Unable to test   Motor: Arm left  Paresis: 2/5  Leg left  Paresis: 2/5  Arm right  Paresis: 1/5  Leg right Paresis: 1/5  Sensation: Withdraws from pain     Laboratory:  CMP:   Recent Labs   Lab 05/28/22  0330   CALCIUM 9.6   ALBUMIN 3.8   PROT 6.6      K 4.1   CO2 25      BUN 18   CREATININE 0.8   ALKPHOS 56   ALT 13   AST 29   BILITOT 1.2*     BMP:   Recent Labs   Lab 05/28/22  0330      K 4.1      CO2 25   BUN 18   CREATININE 0.8   CALCIUM 9.6     CBC:   Recent Labs   Lab 05/28/22  0330   WBC 18.41*   RBC 4.63   HGB 12.2   HCT 37.7      MCV 81*   MCH 26.3*   MCHC 32.4     Lipid Panel:   Recent Labs   Lab 05/27/22  0508   CHOL 154   LDLCALC 104.6   HDL 40   TRIG 47     Coagulation:   Recent Labs   Lab 05/27/22  0047   INR 1.1     Platelet  Aggregation Study: No results for input(s): PLTAGG, PLTAGINTERP, PLTAGREGLACO, ADPPLTAGGREG in the last 168 hours.  Hgb A1C:   Recent Labs   Lab 05/27/22  0508   HGBA1C 5.0     TSH:   Recent Labs   Lab 05/27/22  0508   TSH 0.085*       Diagnostic Results     Brain Imaging   CTH 5/27/22     Hemorrhagic conversion of left MCA infarct similar allowing for a difference in technique to the MR imaging study of the prior day. No midline shift or herniation.    MRI Brain WO Contrast 5/27/22     Acute predominately cortical/subcortical scattered infarcts predominately within the left MCA territory with a few tiny cortical left frontal MAGNUS infarcts.  There is at least petechial hemorrhage however more prominent hemorrhage within the left insular cortex extending to the temporal lobe is questioned and CT imaging for further characterization of the degree of blood products would be helpful if clinically warranted.     Chronic infarcts within the right hemisphere and potentially right cerebellum.    CTH 5/27/22   1. No acute intracranial hemorrhage or other CT evidence of acute intracranial abnormality.  2. Abnormal asymmetric hyperdensity within the distal left M1/proximal M2 branch vessels which could relate to underlying thrombus/occlusion. MRA or CTA can be performed for more definitive evaluation.  3. Remote right temporal occipital infarcts with compensatory dilatation of the posterior and temporal horn of the right lateral ventricle.    Vessel Imaging   IR Angiogram Carotid Cerebral BL 5/27/22   Left M2 occlusion superior division, successful recanalization with a TICI 2b recanalization.    Cardiac Imaging   TTE 5/27/22   Atrial fibrillation not observed.  The left ventricle is normal in size with normal systolic function. The estimated ejection fraction is 65%.  Normal left ventricular diastolic function.  Severe left atrial enlargement.  Moderate right atrial enlargement.  Mild to moderate tricuspid  regurgitation.  Normal right ventricular size with normal right ventricular systolic function.  Mild mitral regurgitation.  There is pulmonary hypertension.  The estimated PA systolic pressure is 59 mmHg.  Elevated central venous pressure (15 mmHg).

## 2022-05-28 NOTE — ASSESSMENT & PLAN NOTE
Current use of long-term anticoagulation  -Stroke risk factor.  Patient currently prescribed Eliquis, metoprolol.  -Currently on metoprolol   - Holding AC at this time

## 2022-05-28 NOTE — ACP (ADVANCE CARE PLANNING)
Advance Care Planning     Date: 05/28/2022    [facility]  I engaged the family (oldest daughter) in a conversation about advance care planning and we specifically addressed what the goals of care would be moving forward, in light of the patient's change in clinical status, specifically increased hypoxia and respiratory insufficiency.  We did specifically address the patient's likely prognosis, which is poor in terms of regaining independence.  We explored the patient's values and preferences for future care.  The family endorses that what is most important right now is to focus on symptom/pain control and quality of life, even if it means sacrificing a little time    Accordingly, we have decided that the best plan to meet the patient's goals includes continuing with treatment and changing her code status to DNR/DNI with plans to transition to comfort care if her respiratory status declines to the point of needing intubation.     I did not explain the role for hospice care at this stage of the patient's illness, including its ability to help the patient live with the best quality of life possible.  We will not be making a hospice referral.    I spent a total of 30 minutes engaging the patient in this advance care planning discussion.

## 2022-05-28 NOTE — PLAN OF CARE
[facility] Care Plan    POC reviewed with Emilia Cotto and family at 0300. Pt non-verbal, able to follow simple commands. Questions and concerns addressed with daughter at bedside. Pt progressing toward goals. Will continue to monitor. See below and flowsheets for full assessment and VS info.     - Orders for Nasotracheal suctioning and neb treatments PRN    - Pt repositioned and changed frequently    - Hourly neuro checks continued    - CT obtained        Is this a stroke patient? Yes; stroke booklet at bedside  Neuro:  [place] Coma Scale  Best Eye Response: 3-->(E3) to speech  Best Motor Response: 6-->(M6) obeys commands  Best Verbal Response: 1-->(V1) none  [place] Coma Scale Score: 10  Assessment Qualifiers: patient not sedated/intubated  Pupil PERRLA: yes     24hr Temp:  [97.7 °F (36.5 °C)-99 °F (37.2 °C)]     CV:   Rhythm: atrial rhythm  BP goals:   SBP < 140  MAP > 65    Resp:   O2 Device (Oxygen Therapy): nasal cannula w/ humidification       Plan: N/A    GI/:     Diet/Nutrition Received: NPO  Last Bowel Movement: 05/27/22  Voiding Characteristics: external catheter, incontinence    Intake/Output Summary (Last 24 hours) at 5/28/2022 0519  Last data filed at 5/28/2022 0320  Gross per 24 hour   Intake 618.55 ml   Output --   Net 618.55 ml     Unmeasured Output  Urine Occurrence: 1  Stool Occurrence: 1  Emesis Occurrence: 1    Labs/Accuchecks:  Recent Labs   Lab 05/27/22  0508   WBC 12.49   RBC 4.50   HGB 12.0   HCT 38.5         Recent Labs   Lab 05/28/22  0330      K 4.1   CO2 25      BUN 18   CREATININE 0.8   ALKPHOS 56   ALT 13   AST 29   BILITOT 1.2*      Recent Labs   Lab 05/27/22  0047   INR 1.1    No results for input(s): CPK, CPKMB, TROPONINI, MB in the last 168 hours.    Electrolytes: N/A - electrolytes WDL  Accuchecks: Q6H    Gtts:   nicardipine 5 mg/hr (05/28/22 0350)       LDA/Wounds:  Lines/Drains/Airways       Airway  Duration                  Nasopharyngeal Airway 1 day               Peripheral Intravenous Line  Duration                  Peripheral IV - Single Lumen 05/27/22 0043 18 G Right Antecubital 1 day         Peripheral IV - Single Lumen 05/27/22 0044 20 G Left Wrist 1 day                  Wounds: No  Wound care consulted: No

## 2022-05-28 NOTE — PLAN OF CARE
[facility] Care Plan    POC reviewed with Emilia Cotto and family at 1400. Family verbalized understanding. Questions and concerns addressed. No acute events today. Pt progressing toward goals. Will continue to monitor. See below and flowsheets for full assessment and VS info.       Pt has remained minimally responsive this shift.  See neuro assessment flowsheet for details.  Discussed neuro assessment with attending MD as well as day shift and night shift NCC nurse practitioners.  Pt did spike a temperature this afternoon.  Blood cultures drawn as ordered.        Is this a stroke patient? yes- Stroke booklet reviewed with family, risk factors identified for patient and stroke booklet remains at bedside for ongoing education.     Neuro:  [place] Coma Scale  Best Eye Response: 1-->(E1) none  Best Motor Response: 5-->(M5) localizes pain  Best Verbal Response: 1-->(V1) none  [place] Coma Scale Score: 7  Assessment Qualifiers: patient not sedated/intubated  Pupil PERRLA: yes     24 hr Temp:  [98 °F (36.7 °C)-102 °F (38.9 °C)]     CV:   Rhythm: atrial rhythm  BP goals:   SBP < 140  MAP > 65    Resp:   O2 Device (Oxygen Therapy): nasal cannula w/ humidification       Plan:  pt on 4L NC.  Thick secretions.  Oral suction PRN    GI/:     Diet/Nutrition Received: NPO  Last Bowel Movement: 05/28/22  Voiding Characteristics: incontinence    Intake/Output Summary (Last 24 hours) at 5/28/2022 1642  Last data filed at 5/28/2022 1241  Gross per 24 hour   Intake 687.34 ml   Output 150 ml   Net 537.34 ml     Unmeasured Output  Urine Occurrence: 1  Stool Occurrence: 1  Emesis Occurrence: 1    Labs/Accuchecks:  Recent Labs   Lab 05/28/22  0330   WBC 18.41*   RBC 4.63   HGB 12.2   HCT 37.7         Recent Labs   Lab 05/28/22  0330      K 4.1   CO2 25      BUN 18   CREATININE 0.8   ALKPHOS 56   ALT 13   AST 29   BILITOT 1.2*      Recent Labs   Lab 05/27/22  0047   INR 1.1    No results for input(s): CPK, CPKMB, TROPONINI,  MB in the last 168 hours.    Electrolytes: N/A - electrolytes WDL  Accuchecks: Q6H    Gtts:      LDA/Wounds:  Lines/Drains/Airways       Airway  Duration                  Nasopharyngeal Airway 1 day              Peripheral Intravenous Line  Duration                  Peripheral IV - Single Lumen 05/27/22 0043 18 G Right Antecubital 1 day         Peripheral IV - Single Lumen 05/27/22 0044 20 G Left Wrist 1 day                  Wounds: No  Wound care consulted: No

## 2022-05-28 NOTE — NURSING
Discussed neuro exam with Raza Harrell NP in the 0800 hour.  Pt will not open eyes spontaneously or follow commands in any extremity.  See neuro exam flowsheet for full neuro assessment.  ABG ordered per NP.  RT notified.  Discussed Neuro exam with Dr. Fox during rounds.  EEG order noted.

## 2022-05-28 NOTE — PT/OT/SLP EVAL
"Speech Language Pathology Evaluation  Bedside Swallow    Patient Name:  Emilia Cotto   MRN:  [id number]  Admitting Diagnosis: <principal problem not specified>    Recommendations:                 General Recommendations:  Dysphagia therapy and Speech language evaluation  Diet recommendations:  NPO, NPO   Aspiration Precautions: Alternate means of nutrition/hydration   General Precautions: Standard, fall, NPO, aspiration  Communication strategies:  none    History:     Past Medical History:   Diagnosis Date    A-fib     A-fib     Anticoagulant long-term use     Hypertension     Thyroid disease        Past Surgical History:   Procedure Laterality Date    HYSTERECTOMY      THYROID SURGERY Right     lobectomy     Prior Intubation HX:  None this admit    Modified Barium Swallow: none found on file    Chest X-Rays: 5/27: "FINDINGS:  Nasogastric tube is been inserted and its tip is in the fundus of the stomach is side hole is barely beyond esophagogastric junction.  Heart size is normal.  Interstitial markings of the lungs are little increased but this may be related to a poor inspiration."    Prior diet: unknown    Subjective     SLP communicated with RN prior to entry and received clearance for therapy this date.   Pt asleep upon ST entry. Daughter present at bedside.     Pain/Comfort:  · Pain Rating 1: 0/10  · Pain Rating Post-Intervention 1: 0/10    Respiratory Status: Nasal cannula    Objective:     Oral Musculature Evaluation  ·  unable to assess 2/2 poor participation/comprehension     Bedside Swallow Eval:   Consistencies Assessed:  · Pt unable to rouse to max verbal, tactile, environmental stim. SLP applied thermal stim bilabially to which no reaction elicited. Initiation of po trials deferred 2/2 somnolence.     Oral Phase:   · Unable to assess    Pharyngeal Phase:   · Unable to assess    Compensatory Strategies  · None    ST educated pt's daughter re: role of SLP, risk factors for aspiration, current npo " rec, and POC moving forward to which she verbalized understanding and agreement. RN aware of findings and recs. Continue ST per POC.     Assessment:     Emilia Cotto is a 81 y.o. female with an SLP diagnosis of Dysphagia.  She presents with somnolence.     Goals:   Multidisciplinary Problems     SLP Goals        Problem: SLP    Goal Priority Disciplines Outcome   SLP Goal     SLP Ongoing, Progressing   Description: Speech Language Pathology Goals  Goals expected to be met by 6/4:  1. Pt will participate in ongoing assessment of swallow function to determine safest and least restrictive diet.   2. Pt will participate in assessment of speech/language/cognitive skills to determine future therapeutic plan of care.                    Plan:     · Patient to be seen:  4 x/week   · Plan of Care expires:  06/26/22  · Plan of Care reviewed with:  patient, daughter   · SLP Follow-Up:  Yes       Discharge recommendations:   (tbd)     Time Tracking:     SLP Treatment Date:   05/28/22  Speech Start Time:  0718  Speech Stop Time:  0726     Speech Total Time (min):  8 min    Billable Minutes: Eval Swallow and Oral Function 8  05/28/2022

## 2022-05-28 NOTE — PROGRESS NOTES
[facility] - Neuro Critical Care  Vascular Neurology  Comprehensive Stroke Center  Progress Note    Assessment/Plan:     * Embolic stroke involving left middle cerebral artery  Emilia Cotto is a 81 y.o. female with significant medical history of Afib (on Eliquis), HTN, hypothyroidism,  presented to hospital as a transfer from [facility] for evaluation of L MCA stroke.  tPA not administered due to the patient taking Eliquis.  CTA head neck with occlusion of the L2 segment of the MCA.  Went for thrombectomy, s/p TICI 2b reperfusion. MRI with L MCA infarct with hemorrhagic conversion in area of stroke. TTE with EF 65%, no AF observed, severe LAE, moderate RUDDY, and pulm HTN.     Patient more drowsy today. Not following commands today. EEG in place, formal read pending.     Etiology: Cardioembolic in setting in AF       Antithrombotics for secondary stroke prevention: Anticoagulants: Apixaban 5 mg BID     Statins for secondary stroke prevention and hyperlipidemia, if present:   Statins: Atorvastatin- 40 mg daily    Aggressive risk factor modification: HTN, A-Fib     Rehab efforts: The patient has been evaluated by a stroke team provider and the therapy needs have been fully considered based off the presenting complaints and exam findings. The following therapy evaluations are needed: PT evaluate and treat, OT evaluate and treat, SLP evaluate and treat    Diagnostics ordered/pending: HgbA1C to assess blood glucose levels, Lipid Profile to assess cholesterol levels, MRI head without contrast to assess brain parenchyma, TTE to assess cardiac function/status , TSH to assess thyroid function, Other: Cerebral Angiogram    VTE prophylaxis: Mechanical prophylaxis: Place SCDs  None: Reason for No Pharmacological VTE Prophylaxis: Currently on anticoagulation    BP parameters: Infarct: Post sucessful thrombectomy, SBP <140  Post unsucessful thrombectomy, SBP <220        Cytotoxic cerebral edema  -Small area of  cytotoxic cerebral edema identified when reviewing brain imaging in the territory of the L middle cerebral artery. There is no mass effect associated with it. We will continue to monitor the patients clinical exam with Q4h neuro checks while on the floor, more frequently while in neuro critical care, for any worsening of symptoms which may indicate expansion of the insult and/or area of the edema resulting in clinical change that may require acute intervention to prevent loss of function and/or death. The pattern is suggestive of embolic etiology.    Continue to monitor for acute neuro exam changes, CTH PRN for exam changes.     Debility  Aphasia  -Due to stroke.   NPO per SLP   IPR per OT/PT     Hypothyroidism  -Stroke risk factor.  -Continue home levothyroxine.    A-fib  Current use of long-term anticoagulation  -Stroke risk factor.  Patient currently prescribed Eliquis, metoprolol.  -Currently on metoprolol   - Holding AC at this time        05/28/2022 Patient's family agreed on DNR/DNI at this time. If patient's respiratory status should decline, will likely be transitioned to comfort care. At this time patient is stable and will continue to undergo evaluation and treatment for recent infarct. NCC started azithromycin and ceftriaxone for lower respiratory infections. Patient will audible rales at bedside, could be heard without auscultation. Cardene d/c at 9am today. TTE with no observed AF, severe LAE, moderate RUDDY, pulm HTN, and EF of 65%. Therapy recommending IPR. EEG being performed, cap in place during rounds today to evaluate for seizure activity. Patient drowsier today than yesterday. Not following commands. Withdraws on R and localizes pain on L. NPO per SLP. Continue current NCC care at this time.       STROKE DOCUMENTATION   Acute Stroke Times   Last Known Normal Date: 05/26/22  Last Known Normal Time: 1900  Symptom Onset Date: 05/27/22  Unknown Symptom Onset Time: Unknown Time  Stroke Team Called  Date: 05/27/22  Stroke Team Arrival Date: 05/27/22  Stroke Team Arrival Time:  (in the ED on the patient's arrival)  CT Interpretation Time: 0040  Alteplase Recommended: No  CTA Interpretation Time: 2301 (obtained prior to transfer)  Thrombectomy Recommended: Yes  Decision to Treat Time for IR: 0043    NIH Scale:  1a. Level of Consciousness: 2-->Not alert, requires repeated stimulation to attend, or is obtunded and requires strong or painful stimulation to make movements (not stereotyped)  1b. LOC Questions: 2-->Answers neither question correctly  1c. LOC Commands: 2-->Performs neither task correctly  2. Best Gaze: 0-->Normal  3. Visual: 0-->No visual loss  4. Facial Palsy: 0-->Normal symmetrical movements  5a. Motor Arm, Left: 3-->No effort against gravity, limb falls  5b. Motor Arm, Right: 3-->No effort against gravity, limb falls  6a. Motor Leg, Left: 3-->No effort against gravity, leg falls to bed immediately  6b. Motor Leg, Right: 3-->No effort against gravity, leg falls to bed immediately  7. Limb Ataxia: 0-->Absent  8. Sensory: 0-->Normal, no sensory loss  9. Best Language: 3-->Mute, global aphasia, no usable speech or auditory comprehension  10. Dysarthria: 2-->Severe dysarthria, patients speech is so slurred as to be unintelligible in the absence of or out of proportion to any dysphasia, or is mute/anarthric  11. Extinction and Inattention (formerly Neglect): 0-->No abnormality  Total (NIH Stroke Scale): 23       Modified Omega Score: 2  [place] Coma Scale:    ABCD2 Score:    [id number] Score:7  HAS -BLED Score:   ICH Score:   Hunt & Colunga Classification:      Hemorrhagic change of an Ischemic Stroke: Does this patient have an ischemic stroke with hemorrhagic changes? Yes, Grading Scale: HI Type 2 (HI-2) = confluent petechiae within the infarcted area but without space-occupying effect. Is this a symptomatic change?  No - Hemorrhage is not clinically significant     Neurologic Chief Complaint: RSW,  aphasia, L gaze    Subjective:     Interval History: Patient is seen for follow-up neurological assessment and treatment recommendations:     Patient's family agreed on DNR/DNI at this time. If patient's respiratory status should decline, will likely be transitioned to comfort care. At this time patient is stable and will continue to undergo evaluation and treatment for recent infarct. NCC started azithromycin and ceftriaxone for lower respiratory infections. Patient will audible rales at bedside, could be heard without auscultation. Cardene d/c at 9am today. TTE with no observed AF, severe LAE, moderate RUDDY, pulm HTN, and EF of 65%. Therapy recommending IPR. EEG being performed, cap in place during rounds today to evaluate for seizure activity. Patient drowsier today than yesterday. Not following commands. Withdraws on R and localizes pain on L. NPO per SLP. Continue current NCC care at this time.     HPI, Past Medical, Family, and Social History remains the same as documented in the initial encounter.     Review of Systems   Unable to perform ROS: Acuity of condition (Nonverbal)   Scheduled Meds:   atorvastatin  40 mg Per NG tube Daily    azithromycin  500 mg Intravenous Q24H    cefTRIAXone (ROCEPHIN) IVPB  1 g Intravenous Q24H    digoxin  0.25 mg Per NG tube Daily    levothyroxine  88 mcg Per NG tube Before breakfast    metoprolol tartrate  12.5 mg Per NG tube BID    mupirocin   Nasal BID    senna-docusate 8.6-50 mg  1 tablet Per NG tube BID    sodium chloride 3%  4 mL Nebulization Q6H     Continuous Infusions:  PRN Meds:acetaminophen, albuterol-ipratropium, hydrALAZINE, labetaloL, magnesium oxide, magnesium oxide, ondansetron, potassium bicarbonate, potassium bicarbonate, potassium bicarbonate, potassium, sodium phosphates, potassium, sodium phosphates, potassium, sodium phosphates, sodium chloride 0.9%    Objective:     Vital Signs (Most Recent):  Temp: 99.1 °F (37.3 °C) (05/28/22 1200)  Pulse: 95  (05/28/22 1336)  Resp: (!) 22 (05/28/22 1336)  BP: (!) 140/62 (05/28/22 1300)  SpO2: (!) 93 % (05/28/22 1336)  BP Location: Left arm    Vital Signs Range (Last 24H):  Temp:  [98 °F (36.7 °C)-99.2 °F (37.3 °C)]   Pulse:  []   Resp:  [18-45]   BP: (114-142)/(56-65)   SpO2:  [91 %-100 %]   BP Location: Left arm    Physical Exam  Vitals and nursing note reviewed.   Constitutional:       Appearance: She is not diaphoretic.   HENT:      Head:      Comments: EEG cap in place during evaluation today      Nose: No rhinorrhea.   Eyes:      General: No scleral icterus.  Cardiovascular:      Rate and Rhythm: Normal rate.   Pulmonary:      Effort: Pulmonary effort is normal. No respiratory distress.   Musculoskeletal:         General: No deformity.   Skin:     General: Skin is warm and dry.   Neurological:      Mental Status: She is lethargic.      Motor: Weakness present.       Neurological Exam:   LOC: obtunded  Attention Span: unable to test   Language: Mute  Articulation: Mute/Anarthric  Orientation: Untestable due to severe aphasia   Visual Fields: Not blinking to threat  Pupils (CN II, III): PERRL  Facial Movement (CN VII): Unable to test   Motor: Arm left  Paresis: 2/5  Leg left  Paresis: 2/5  Arm right  Paresis: 1/5  Leg right Paresis: 1/5  Sensation: Withdraws from pain     Laboratory:  CMP:   Recent Labs   Lab 05/28/22  0330   CALCIUM 9.6   ALBUMIN 3.8   PROT 6.6      K 4.1   CO2 25      BUN 18   CREATININE 0.8   ALKPHOS 56   ALT 13   AST 29   BILITOT 1.2*     BMP:   Recent Labs   Lab 05/28/22  0330      K 4.1      CO2 25   BUN 18   CREATININE 0.8   CALCIUM 9.6     CBC:   Recent Labs   Lab 05/28/22  0330   WBC 18.41*   RBC 4.63   HGB 12.2   HCT 37.7      MCV 81*   MCH 26.3*   MCHC 32.4     Lipid Panel:   Recent Labs   Lab 05/27/22  0508   CHOL 154   LDLCALC 104.6   HDL 40   TRIG 47     Coagulation:   Recent Labs   Lab 05/27/22  0047   INR 1.1     Platelet Aggregation Study: No  results for input(s): PLTAGG, PLTAGINTERP, PLTAGREGLACO, ADPPLTAGGREG in the last 168 hours.  Hgb A1C:   Recent Labs   Lab 05/27/22  0508   HGBA1C 5.0     TSH:   Recent Labs   Lab 05/27/22  0508   TSH 0.085*       Diagnostic Results     Brain Imaging   CTH 5/27/22     Hemorrhagic conversion of left MCA infarct similar allowing for a difference in technique to the MR imaging study of the prior day. No midline shift or herniation.    MRI Brain WO Contrast 5/27/22     Acute predominately cortical/subcortical scattered infarcts predominately within the left MCA territory with a few tiny cortical left frontal MGANUS infarcts.  There is at least petechial hemorrhage however more prominent hemorrhage within the left insular cortex extending to the temporal lobe is questioned and CT imaging for further characterization of the degree of blood products would be helpful if clinically warranted.     Chronic infarcts within the right hemisphere and potentially right cerebellum.    CTH 5/27/22   1. No acute intracranial hemorrhage or other CT evidence of acute intracranial abnormality.  2. Abnormal asymmetric hyperdensity within the distal left M1/proximal M2 branch vessels which could relate to underlying thrombus/occlusion. MRA or CTA can be performed for more definitive evaluation.  3. Remote right temporal occipital infarcts with compensatory dilatation of the posterior and temporal horn of the right lateral ventricle.    Vessel Imaging   IR Angiogram Carotid Cerebral BL 5/27/22   Left M2 occlusion superior division, successful recanalization with a TICI 2b recanalization.    Cardiac Imaging   TTE 5/27/22   · Atrial fibrillation not observed.  · The left ventricle is normal in size with normal systolic function. The estimated ejection fraction is 65%.  · Normal left ventricular diastolic function.  · Severe left atrial enlargement.  · Moderate right atrial enlargement.  · Mild to moderate tricuspid regurgitation.  · Normal right  ventricular size with normal right ventricular systolic function.  · Mild mitral regurgitation.  · There is pulmonary hypertension.  · The estimated PA systolic pressure is 59 mmHg.  · Elevated central venous pressure (15 mmHg).        Lance Staley PA-C  [facility]  Department of Vascular Neurology   [facility]

## 2022-05-28 NOTE — PLAN OF CARE
Problem: SLP  Goal: SLP Goal  Description: Speech Language Pathology Goals  Goals expected to be met by 6/4:  1. Pt will participate in ongoing assessment of swallow function to determine safest and least restrictive diet.   2. Pt will participate in assessment of speech/language/cognitive skills to determine future therapeutic plan of care.   Outcome: Ongoing, Progressing  SLP rec: NPO with alternate means of nutrition/hydration/medications. Continue ST per POC.  5/28/2022

## 2022-05-28 NOTE — HOSPITAL COURSE
05/28/2022 Patient's family agreed on DNR/DNI at this time. If patient's respiratory status should decline, will likely be transitioned to comfort care. At this time patient is stable and will continue to undergo evaluation and treatment for recent infarct. NCC started azithromycin and ceftriaxone for lower respiratory infections. Patient will audible rales at bedside, could be heard without auscultation. Cardene d/c at 9am today. TTE with no observed AF, severe LAE, moderate RUDDY, pulm HTN, and EF of 65%. Therapy recommending IPR. EEG being performed, cap in place during rounds today to evaluate for seizure activity. Patient drowsier today than yesterday. Not following commands. Withdraws on R and localizes pain on L. NPO per SLP. Continue current NCC care at this time.

## 2022-05-29 LAB
ALBUMIN SERPL BCP-MCNC: 3.1 G/DL (ref 3.5–5.2)
ALLENS TEST: ABNORMAL
ALP SERPL-CCNC: 51 U/L (ref 55–135)
ALT SERPL W/O P-5'-P-CCNC: 15 U/L (ref 10–44)
ANION GAP SERPL CALC-SCNC: 8 MMOL/L (ref 8–16)
ANISOCYTOSIS BLD QL SMEAR: SLIGHT
AST SERPL-CCNC: 34 U/L (ref 10–40)
BASOPHILS # BLD AUTO: 0.06 K/UL (ref 0–0.2)
BASOPHILS NFR BLD: 0.2 % (ref 0–1.9)
BILIRUB SERPL-MCNC: 1.8 MG/DL (ref 0.1–1)
BUN SERPL-MCNC: 27 MG/DL (ref 8–23)
CALCIUM SERPL-MCNC: 8.8 MG/DL (ref 8.7–10.5)
CHLORIDE SERPL-SCNC: 106 MMOL/L (ref 95–110)
CO2 SERPL-SCNC: 24 MMOL/L (ref 23–29)
CREAT SERPL-MCNC: 0.8 MG/DL (ref 0.5–1.4)
DELSYS: ABNORMAL
DIFFERENTIAL METHOD: ABNORMAL
EOSINOPHIL # BLD AUTO: 0 K/UL (ref 0–0.5)
EOSINOPHIL NFR BLD: 0 % (ref 0–8)
ERYTHROCYTE [DISTWIDTH] IN BLOOD BY AUTOMATED COUNT: 14.2 % (ref 11.5–14.5)
EST. GFR  (AFRICAN AMERICAN): >60 ML/MIN/1.73 M^2
EST. GFR  (NON AFRICAN AMERICAN): >60 ML/MIN/1.73 M^2
FIO2: 50
FLOW: 15
GLUCOSE SERPL-MCNC: 115 MG/DL (ref 70–110)
HCO3 UR-SCNC: 29.1 MMOL/L (ref 24–28)
HCT VFR BLD AUTO: 38.4 % (ref 37–48.5)
HGB BLD-MCNC: 12.3 G/DL (ref 12–16)
HYPOCHROMIA BLD QL SMEAR: ABNORMAL
IMM GRANULOCYTES # BLD AUTO: 0.17 K/UL (ref 0–0.04)
IMM GRANULOCYTES NFR BLD AUTO: 0.6 % (ref 0–0.5)
LYMPHOCYTES # BLD AUTO: 0.5 K/UL (ref 1–4.8)
LYMPHOCYTES NFR BLD: 2 % (ref 18–48)
MAGNESIUM SERPL-MCNC: 2.3 MG/DL (ref 1.6–2.6)
MCH RBC QN AUTO: 26.8 PG (ref 27–31)
MCHC RBC AUTO-ENTMCNC: 32 G/DL (ref 32–36)
MCV RBC AUTO: 84 FL (ref 82–98)
MODE: ABNORMAL
MONOCYTES # BLD AUTO: 1.2 K/UL (ref 0.3–1)
MONOCYTES NFR BLD: 4.4 % (ref 4–15)
NEUTROPHILS # BLD AUTO: 24.8 K/UL (ref 1.8–7.7)
NEUTROPHILS NFR BLD: 92.8 % (ref 38–73)
NRBC BLD-RTO: 0 /100 WBC
OVALOCYTES BLD QL SMEAR: ABNORMAL
PCO2 BLDA: 33.3 MMHG (ref 35–45)
PH SMN: 7.55 [PH] (ref 7.35–7.45)
PHOSPHATE SERPL-MCNC: 1.8 MG/DL (ref 2.7–4.5)
PLATELET # BLD AUTO: 257 K/UL (ref 150–450)
PLATELET BLD QL SMEAR: ABNORMAL
PMV BLD AUTO: 11.5 FL (ref 9.2–12.9)
PO2 BLDA: 132 MMHG (ref 80–100)
POC BE: 7 MMOL/L
POC SATURATED O2: 99 % (ref 95–100)
POC TCO2: 30 MMOL/L (ref 23–27)
POIKILOCYTOSIS BLD QL SMEAR: SLIGHT
POTASSIUM SERPL-SCNC: 3.5 MMOL/L (ref 3.5–5.1)
PROT SERPL-MCNC: 5.9 G/DL (ref 6–8.4)
RBC # BLD AUTO: 4.59 M/UL (ref 4–5.4)
SAMPLE: ABNORMAL
SITE: ABNORMAL
SODIUM SERPL-SCNC: 138 MMOL/L (ref 136–145)
SP02: 99
WBC # BLD AUTO: 26.74 K/UL (ref 3.9–12.7)

## 2022-05-29 PROCEDURE — 99291 PR CRITICAL CARE, E/M 30-74 MINUTES: ICD-10-PCS | Mod: ,,, | Performed by: PSYCHIATRY & NEUROLOGY

## 2022-05-29 PROCEDURE — 25000003 PHARM REV CODE 250: Performed by: NURSE PRACTITIONER

## 2022-05-29 PROCEDURE — 25000242 PHARM REV CODE 250 ALT 637 W/ HCPCS: Performed by: NURSE PRACTITIONER

## 2022-05-29 PROCEDURE — 94640 AIRWAY INHALATION TREATMENT: CPT

## 2022-05-29 PROCEDURE — 84100 ASSAY OF PHOSPHORUS: CPT | Performed by: NURSE PRACTITIONER

## 2022-05-29 PROCEDURE — 95718 PR EEG, W/VIDEO, CONT RECORD, I&R, 2-12 HRS: ICD-10-PCS | Mod: ,,, | Performed by: PSYCHIATRY & NEUROLOGY

## 2022-05-29 PROCEDURE — 87070 CULTURE OTHR SPECIMN AEROBIC: CPT | Performed by: NURSE PRACTITIONER

## 2022-05-29 PROCEDURE — 87186 SC STD MICRODIL/AGAR DIL: CPT | Performed by: NURSE PRACTITIONER

## 2022-05-29 PROCEDURE — 36600 WITHDRAWAL OF ARTERIAL BLOOD: CPT

## 2022-05-29 PROCEDURE — 94761 N-INVAS EAR/PLS OXIMETRY MLT: CPT

## 2022-05-29 PROCEDURE — 99900035 HC TECH TIME PER 15 MIN (STAT)

## 2022-05-29 PROCEDURE — 99291 CRITICAL CARE FIRST HOUR: CPT | Mod: ,,, | Performed by: PSYCHIATRY & NEUROLOGY

## 2022-05-29 PROCEDURE — 83735 ASSAY OF MAGNESIUM: CPT | Performed by: NURSE PRACTITIONER

## 2022-05-29 PROCEDURE — 25000003 PHARM REV CODE 250: Performed by: PSYCHIATRY & NEUROLOGY

## 2022-05-29 PROCEDURE — 20000000 HC ICU ROOM

## 2022-05-29 PROCEDURE — 63600175 PHARM REV CODE 636 W HCPCS: Performed by: NURSE PRACTITIONER

## 2022-05-29 PROCEDURE — 94667 MNPJ CHEST WALL 1ST: CPT

## 2022-05-29 PROCEDURE — 94668 MNPJ CHEST WALL SBSQ: CPT

## 2022-05-29 PROCEDURE — 27000221 HC OXYGEN, UP TO 24 HOURS

## 2022-05-29 PROCEDURE — 87205 SMEAR GRAM STAIN: CPT | Performed by: NURSE PRACTITIONER

## 2022-05-29 PROCEDURE — 87077 CULTURE AEROBIC IDENTIFY: CPT | Performed by: NURSE PRACTITIONER

## 2022-05-29 PROCEDURE — 63600175 PHARM REV CODE 636 W HCPCS: Performed by: PSYCHIATRY & NEUROLOGY

## 2022-05-29 PROCEDURE — 80053 COMPREHEN METABOLIC PANEL: CPT | Performed by: NURSE PRACTITIONER

## 2022-05-29 PROCEDURE — 82803 BLOOD GASES ANY COMBINATION: CPT

## 2022-05-29 PROCEDURE — 31720 CLEARANCE OF AIRWAYS: CPT

## 2022-05-29 PROCEDURE — 99900026 HC AIRWAY MAINTENANCE (STAT)

## 2022-05-29 PROCEDURE — 95718 EEG PHYS/QHP 2-12 HR W/VEEG: CPT | Mod: ,,, | Performed by: PSYCHIATRY & NEUROLOGY

## 2022-05-29 PROCEDURE — 85025 COMPLETE CBC W/AUTO DIFF WBC: CPT | Performed by: NURSE PRACTITIONER

## 2022-05-29 RX ORDER — IPRATROPIUM BROMIDE AND ALBUTEROL SULFATE 2.5; .5 MG/3ML; MG/3ML
3 SOLUTION RESPIRATORY (INHALATION) EVERY 6 HOURS
Status: DISCONTINUED | OUTPATIENT
Start: 2022-05-29 | End: 2022-05-30

## 2022-05-29 RX ORDER — HEPARIN SODIUM 5000 [USP'U]/ML
5000 INJECTION, SOLUTION INTRAVENOUS; SUBCUTANEOUS EVERY 8 HOURS
Status: DISCONTINUED | OUTPATIENT
Start: 2022-05-29 | End: 2022-05-30

## 2022-05-29 RX ADMIN — DIGOXIN 0.25 MG: 125 TABLET ORAL at 08:05

## 2022-05-29 RX ADMIN — METOPROLOL TARTRATE 12.5 MG: 25 TABLET, FILM COATED ORAL at 08:05

## 2022-05-29 RX ADMIN — MUPIROCIN: 20 OINTMENT TOPICAL at 08:05

## 2022-05-29 RX ADMIN — PIPERACILLIN SODIUM AND TAZOBACTAM SODIUM 4.5 G: 4; .5 INJECTION, POWDER, LYOPHILIZED, FOR SOLUTION INTRAVENOUS at 08:05

## 2022-05-29 RX ADMIN — HEPARIN SODIUM 5000 UNITS: 5000 INJECTION INTRAVENOUS; SUBCUTANEOUS at 08:05

## 2022-05-29 RX ADMIN — LEVOTHYROXINE SODIUM 88 MCG: 88 TABLET ORAL at 06:05

## 2022-05-29 RX ADMIN — IPRATROPIUM BROMIDE AND ALBUTEROL SULFATE 3 ML: 2.5; .5 SOLUTION RESPIRATORY (INHALATION) at 08:05

## 2022-05-29 RX ADMIN — METOPROLOL TARTRATE 12.5 MG: 25 TABLET, FILM COATED ORAL at 07:05

## 2022-05-29 RX ADMIN — MUPIROCIN: 20 OINTMENT TOPICAL at 09:05

## 2022-05-29 RX ADMIN — ACETAMINOPHEN 650 MG: 325 TABLET ORAL at 08:05

## 2022-05-29 RX ADMIN — SODIUM CHLORIDE SOLN NEBU 3% 4 ML: 3 NEBU SOLN at 07:05

## 2022-05-29 RX ADMIN — ATORVASTATIN CALCIUM 40 MG: 20 TABLET, FILM COATED ORAL at 08:05

## 2022-05-29 RX ADMIN — SODIUM CHLORIDE SOLN NEBU 3% 4 ML: 3 NEBU SOLN at 01:05

## 2022-05-29 RX ADMIN — IPRATROPIUM BROMIDE AND ALBUTEROL SULFATE 3 ML: 2.5; .5 SOLUTION RESPIRATORY (INHALATION) at 12:05

## 2022-05-29 RX ADMIN — HEPARIN SODIUM 5000 UNITS: 5000 INJECTION INTRAVENOUS; SUBCUTANEOUS at 02:05

## 2022-05-29 RX ADMIN — IPRATROPIUM BROMIDE AND ALBUTEROL SULFATE 3 ML: 2.5; .5 SOLUTION RESPIRATORY (INHALATION) at 07:05

## 2022-05-29 RX ADMIN — ACETAMINOPHEN 650 MG: 325 TABLET ORAL at 04:05

## 2022-05-29 RX ADMIN — SODIUM CHLORIDE SOLN NEBU 3% 4 ML: 3 NEBU SOLN at 12:05

## 2022-05-29 RX ADMIN — PIPERACILLIN SODIUM AND TAZOBACTAM SODIUM 4.5 G: 4; .5 INJECTION, POWDER, LYOPHILIZED, FOR SOLUTION INTRAVENOUS at 04:05

## 2022-05-29 RX ADMIN — IPRATROPIUM BROMIDE AND ALBUTEROL SULFATE 3 ML: 2.5; .5 SOLUTION RESPIRATORY (INHALATION) at 01:05

## 2022-05-29 RX ADMIN — SODIUM CHLORIDE SOLN NEBU 3% 4 ML: 3 NEBU SOLN at 08:05

## 2022-05-29 RX ADMIN — HYDRALAZINE HYDROCHLORIDE 10 MG: 20 INJECTION, SOLUTION INTRAMUSCULAR; INTRAVENOUS at 09:05

## 2022-05-29 RX ADMIN — HYDRALAZINE HYDROCHLORIDE 10 MG: 20 INJECTION, SOLUTION INTRAMUSCULAR; INTRAVENOUS at 01:05

## 2022-05-29 RX ADMIN — VANCOMYCIN HYDROCHLORIDE 1000 MG: 1 INJECTION, POWDER, LYOPHILIZED, FOR SOLUTION INTRAVENOUS at 08:05

## 2022-05-29 RX ADMIN — SENNOSIDES AND DOCUSATE SODIUM 1 TABLET: 50; 8.6 TABLET ORAL at 08:05

## 2022-05-29 NOTE — PROGRESS NOTES
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  Complicated by hemorrhagic conversion  Imaging unchanged, exam worsened  Likely toxic/metabolic  -holding ac/ap, starting princess today  -sbp<180  -ceeg    Acute hypoxic respiratory insufficiency, high risk of resp failure  L lower lobe pna  -aggressive pulm toilet, cpt, early mobility  -abx, sputum cx pending    Afib, rate controlled  -cont dig, holding ac    Critical condition in that Patient has a condition that poses threat to life and bodily function: as above     37 minutes of Critical care time was spent personally by me on the following activities: development of treatment plan with patient or surrogate and bedside caregivers, discussions with consultants, evaluation of patient's response to treatment, examination of patient, ordering and performing treatments and interventions, ordering and review of laboratory studies, ordering and review of radiographic studies, pulse oximetry, antibiotic titration if applicable, vasopressor titration if applicable, re-evaluation of patient's condition. This critical care time did not overlap with that of any other provider or involve time for any procedures. There is high probability for acute neurological change leading to clinical and possibly life-threatening deterioration requiring highest level of physician preparedness for urgent intervention.

## 2022-05-29 NOTE — PROGRESS NOTES
Pharmacist Renal Dose Adjustment Note    Emilia Cotto is a 81 y.o. female being treated with the medication piperacillin-tazobactam (Zosyn)    Recent Labs   Lab 05/27/22  0508 05/28/22  0330 05/29/22  0410   CREATININE 0.7 0.8 0.8     Serum creatinine: 0.8 mg/dL 05/29/22 0410  Estimated creatinine clearance: 51.3 mL/min    Zosyn 4.5 gm Q12H (extended infusion) will be renally adjusted to Zosyn 4.5 gm Q8H for CrCl > 20mL/min      Lesley Hannon, PharmD, [facility]  Neurocritical Care Pharmacist  [id number]

## 2022-05-29 NOTE — PLAN OF CARE
Problem: Adult Inpatient Plan of Care  Goal: Plan of Care Review  Outcome: Ongoing, Progressing  Goal: Patient-Specific Goal (Individualized)  Description: Admit Date: 2022    <principal problem not specified>    Past Medical History:  No date: A-fib  No date: A-fib  No date: Anticoagulant long-term use  No date: Hypertension  No date: Thyroid disease    Past Surgical History:  No date: HYSTERECTOMY  No date: THYROID SURGERY; Right      Comment:  lobectomy    Individualization:   1. Dim lights at night    Restraints:   Restraint Order  Length of Order: Order good for next 24 hours or when removed.  Date that the current order will : 22  Time that the current order will : 0739  Order Upon Application: Yes          Outcome: Ongoing, Progressing  Goal: Absence of Hospital-Acquired Illness or Injury  Outcome: Ongoing, Progressing  Goal: Optimal Comfort and Wellbeing  Outcome: Ongoing, Progressing  Goal: Readiness for Transition of Care  Outcome: Ongoing, Progressing     Problem: Adjustment to Illness (Stroke, Ischemic/Transient Ischemic Attack)  Goal: Optimal Coping  Outcome: Ongoing, Progressing     Problem: Bowel Elimination Impaired (Stroke, Ischemic/Transient Ischemic Attack)  Goal: Effective Bowel Elimination  Outcome: Ongoing, Progressing     Problem: Cerebral Tissue Perfusion (Stroke, Ischemic/Transient Ischemic Attack)  Goal: Optimal Cerebral Tissue Perfusion  Outcome: Ongoing, Progressing     Problem: Cognitive Impairment (Stroke, Ischemic/Transient Ischemic Attack)  Goal: Optimal Cognitive Function  Outcome: Ongoing, Progressing     Problem: Communication Impairment (Stroke, Ischemic/Transient Ischemic Attack)  Goal: Improved Communication Skills  Outcome: Ongoing, Progressing     Problem: Functional Ability Impaired (Stroke, Ischemic/Transient Ischemic Attack)  Goal: Optimal Functional Ability  Outcome: Ongoing, Progressing     Problem: Respiratory Compromise (Stroke,  Ischemic/Transient Ischemic Attack)  Goal: Effective Oxygenation and Ventilation  Outcome: Ongoing, Progressing     Problem: Sensorimotor Impairment (Stroke, Ischemic/Transient Ischemic Attack)  Goal: Improved Sensorimotor Function  Outcome: Ongoing, Progressing     Problem: Swallowing Impairment (Stroke, Ischemic/Transient Ischemic Attack)  Goal: Optimal Eating and Swallowing without Aspiration  Outcome: Ongoing, Progressing     Problem: Urinary Elimination Impaired (Stroke, Ischemic/Transient Ischemic Attack)  Goal: Effective Urinary Elimination  Outcome: Ongoing, Progressing     Problem: Fall Injury Risk  Goal: Absence of Fall and Fall-Related Injury  Outcome: Ongoing, Progressing     Problem: Restraint, Nonbehavioral (Nonviolent)  Goal: Absence of Harm or Injury  Outcome: Ongoing, Progressing     Problem: Skin Injury Risk Increased  Goal: Skin Health and Integrity  Outcome: Ongoing, Progressing

## 2022-05-29 NOTE — PROGRESS NOTES
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  Complicated by hemorrhagic conversion  Imaging unchanged, exam worsened  Likely toxic/metabolic  -holding ac/ap  -sbp<160  -ceeg    Acute hypoxic respiratory insufficiency, high risk of resp failure  Likely aspiration  -aggressive pulm toilet  -abx, sputum cx    Afib, rate controlled  -cont dig, holding ac    D/w oldest daughter goc, please see [place] acp note    Critical condition in that Patient has a condition that poses threat to life and bodily function: as above     37 minutes of Critical care time was spent personally by me on the following activities: development of treatment plan with patient or surrogate and bedside caregivers, discussions with consultants, evaluation of patient's response to treatment, examination of patient, ordering and performing treatments and interventions, ordering and review of laboratory studies, ordering and review of radiographic studies, pulse oximetry, antibiotic titration if applicable, vasopressor titration if applicable, re-evaluation of patient's condition. This critical care time did not overlap with that of any other provider or involve time for any procedures. There is high probability for acute neurological change leading to clinical and possibly life-threatening deterioration requiring highest level of physician preparedness for urgent intervention.

## 2022-05-30 VITALS
DIASTOLIC BLOOD PRESSURE: 63 MMHG | BODY MASS INDEX: 21.49 KG/M2 | RESPIRATION RATE: 29 BRPM | SYSTOLIC BLOOD PRESSURE: 143 MMHG | WEIGHT: 129 LBS | TEMPERATURE: 99 F | OXYGEN SATURATION: 98 % | HEART RATE: 85 BPM | HEIGHT: 65 IN

## 2022-05-30 PROBLEM — I63.9 STROKE: Status: ACTIVE | Noted: 2022-05-27

## 2022-05-30 PROBLEM — Z51.5 COMFORT MEASURES ONLY STATUS: Status: ACTIVE | Noted: 2022-05-30

## 2022-05-30 PROBLEM — J18.9 PNA (PNEUMONIA): Status: ACTIVE | Noted: 2022-05-30

## 2022-05-30 LAB
ALBUMIN SERPL BCP-MCNC: 2.6 G/DL (ref 3.5–5.2)
ALP SERPL-CCNC: 65 U/L (ref 55–135)
ALT SERPL W/O P-5'-P-CCNC: 15 U/L (ref 10–44)
ANION GAP SERPL CALC-SCNC: 11 MMOL/L (ref 8–16)
ANISOCYTOSIS BLD QL SMEAR: SLIGHT
AST SERPL-CCNC: 28 U/L (ref 10–40)
BASOPHILS # BLD AUTO: 0.02 K/UL (ref 0–0.2)
BASOPHILS NFR BLD: 0.1 % (ref 0–1.9)
BILIRUB SERPL-MCNC: 1.4 MG/DL (ref 0.1–1)
BUN SERPL-MCNC: 25 MG/DL (ref 8–23)
CALCIUM SERPL-MCNC: 8.6 MG/DL (ref 8.7–10.5)
CHLORIDE SERPL-SCNC: 108 MMOL/L (ref 95–110)
CO2 SERPL-SCNC: 21 MMOL/L (ref 23–29)
CREAT SERPL-MCNC: 0.7 MG/DL (ref 0.5–1.4)
DIFFERENTIAL METHOD: ABNORMAL
EOSINOPHIL # BLD AUTO: 0 K/UL (ref 0–0.5)
EOSINOPHIL NFR BLD: 0 % (ref 0–8)
ERYTHROCYTE [DISTWIDTH] IN BLOOD BY AUTOMATED COUNT: 14.3 % (ref 11.5–14.5)
EST. GFR  (AFRICAN AMERICAN): >60 ML/MIN/1.73 M^2
EST. GFR  (NON AFRICAN AMERICAN): >60 ML/MIN/1.73 M^2
GLUCOSE SERPL-MCNC: 99 MG/DL (ref 70–110)
HCT VFR BLD AUTO: 39.6 % (ref 37–48.5)
HGB BLD-MCNC: 13 G/DL (ref 12–16)
HYPOCHROMIA BLD QL SMEAR: ABNORMAL
IMM GRANULOCYTES # BLD AUTO: 0.12 K/UL (ref 0–0.04)
IMM GRANULOCYTES NFR BLD AUTO: 0.6 % (ref 0–0.5)
LYMPHOCYTES # BLD AUTO: 0.3 K/UL (ref 1–4.8)
LYMPHOCYTES NFR BLD: 1.3 % (ref 18–48)
MAGNESIUM SERPL-MCNC: 2.1 MG/DL (ref 1.6–2.6)
MCH RBC QN AUTO: 26.6 PG (ref 27–31)
MCHC RBC AUTO-ENTMCNC: 32.8 G/DL (ref 32–36)
MCV RBC AUTO: 81 FL (ref 82–98)
MONOCYTES # BLD AUTO: 0.8 K/UL (ref 0.3–1)
MONOCYTES NFR BLD: 3.7 % (ref 4–15)
NEUTROPHILS # BLD AUTO: 20 K/UL (ref 1.8–7.7)
NEUTROPHILS NFR BLD: 94.3 % (ref 38–73)
NRBC BLD-RTO: 0 /100 WBC
OVALOCYTES BLD QL SMEAR: ABNORMAL
PHOSPHATE SERPL-MCNC: 1.6 MG/DL (ref 2.7–4.5)
PLATELET # BLD AUTO: 274 K/UL (ref 150–450)
PMV BLD AUTO: 11.4 FL (ref 9.2–12.9)
POCT GLUCOSE: 84 MG/DL (ref 70–110)
POIKILOCYTOSIS BLD QL SMEAR: SLIGHT
POLYCHROMASIA BLD QL SMEAR: ABNORMAL
POTASSIUM SERPL-SCNC: 3.1 MMOL/L (ref 3.5–5.1)
PROT SERPL-MCNC: 5.9 G/DL (ref 6–8.4)
RBC # BLD AUTO: 4.88 M/UL (ref 4–5.4)
SCHISTOCYTES BLD QL SMEAR: ABNORMAL
SODIUM SERPL-SCNC: 140 MMOL/L (ref 136–145)
WBC # BLD AUTO: 21.24 K/UL (ref 3.9–12.7)

## 2022-05-30 PROCEDURE — 25000003 PHARM REV CODE 250: Performed by: NURSE PRACTITIONER

## 2022-05-30 PROCEDURE — 94640 AIRWAY INHALATION TREATMENT: CPT

## 2022-05-30 PROCEDURE — 25000003 PHARM REV CODE 250: Performed by: PSYCHIATRY & NEUROLOGY

## 2022-05-30 PROCEDURE — 25000242 PHARM REV CODE 250 ALT 637 W/ HCPCS: Performed by: NURSE PRACTITIONER

## 2022-05-30 PROCEDURE — 99291 CRITICAL CARE FIRST HOUR: CPT | Mod: GC,,, | Performed by: PSYCHIATRY & NEUROLOGY

## 2022-05-30 PROCEDURE — 27000221 HC OXYGEN, UP TO 24 HOURS

## 2022-05-30 PROCEDURE — 83735 ASSAY OF MAGNESIUM: CPT | Performed by: NURSE PRACTITIONER

## 2022-05-30 PROCEDURE — 99900035 HC TECH TIME PER 15 MIN (STAT)

## 2022-05-30 PROCEDURE — 84100 ASSAY OF PHOSPHORUS: CPT | Performed by: NURSE PRACTITIONER

## 2022-05-30 PROCEDURE — 94668 MNPJ CHEST WALL SBSQ: CPT

## 2022-05-30 PROCEDURE — 94761 N-INVAS EAR/PLS OXIMETRY MLT: CPT

## 2022-05-30 PROCEDURE — 63600175 PHARM REV CODE 636 W HCPCS: Performed by: NURSE PRACTITIONER

## 2022-05-30 PROCEDURE — 63600175 PHARM REV CODE 636 W HCPCS: Performed by: PSYCHIATRY & NEUROLOGY

## 2022-05-30 PROCEDURE — 97535 SELF CARE MNGMENT TRAINING: CPT

## 2022-05-30 PROCEDURE — 92523 SPEECH SOUND LANG COMPREHEN: CPT

## 2022-05-30 PROCEDURE — 99291 PR CRITICAL CARE, E/M 30-74 MINUTES: ICD-10-PCS | Mod: GC,,, | Performed by: PSYCHIATRY & NEUROLOGY

## 2022-05-30 PROCEDURE — 80053 COMPREHEN METABOLIC PANEL: CPT | Performed by: NURSE PRACTITIONER

## 2022-05-30 PROCEDURE — 85025 COMPLETE CBC W/AUTO DIFF WBC: CPT | Performed by: NURSE PRACTITIONER

## 2022-05-30 RX ORDER — LORAZEPAM 2 MG/ML
1 INJECTION INTRAMUSCULAR EVERY 30 MIN PRN
Status: DISCONTINUED | OUTPATIENT
Start: 2022-05-30 | End: 2022-05-30 | Stop reason: HOSPADM

## 2022-05-30 RX ORDER — LABETALOL HYDROCHLORIDE 5 MG/ML
10 INJECTION, SOLUTION INTRAVENOUS EVERY 4 HOURS PRN
Status: DISCONTINUED | OUTPATIENT
Start: 2022-05-30 | End: 2022-05-30

## 2022-05-30 RX ORDER — SCOLOPAMINE TRANSDERMAL SYSTEM 1 MG/1
1 PATCH, EXTENDED RELEASE TRANSDERMAL
Status: DISCONTINUED | OUTPATIENT
Start: 2022-05-30 | End: 2022-05-30 | Stop reason: HOSPADM

## 2022-05-30 RX ORDER — HYDRALAZINE HYDROCHLORIDE 20 MG/ML
10 INJECTION INTRAMUSCULAR; INTRAVENOUS EVERY 4 HOURS PRN
Status: DISCONTINUED | OUTPATIENT
Start: 2022-05-30 | End: 2022-05-30

## 2022-05-30 RX ORDER — MORPHINE SULFATE 2 MG/ML
2 INJECTION, SOLUTION INTRAMUSCULAR; INTRAVENOUS EVERY 30 MIN PRN
Status: DISCONTINUED | OUTPATIENT
Start: 2022-05-30 | End: 2022-05-30 | Stop reason: HOSPADM

## 2022-05-30 RX ADMIN — MUPIROCIN: 20 OINTMENT TOPICAL at 09:05

## 2022-05-30 RX ADMIN — IPRATROPIUM BROMIDE AND ALBUTEROL SULFATE 3 ML: 2.5; .5 SOLUTION RESPIRATORY (INHALATION) at 01:05

## 2022-05-30 RX ADMIN — SCOPALAMINE 1 PATCH: 1 PATCH, EXTENDED RELEASE TRANSDERMAL at 02:05

## 2022-05-30 RX ADMIN — HEPARIN SODIUM 5000 UNITS: 5000 INJECTION INTRAVENOUS; SUBCUTANEOUS at 05:05

## 2022-05-30 RX ADMIN — DIGOXIN 0.25 MG: 125 TABLET ORAL at 09:05

## 2022-05-30 RX ADMIN — IPRATROPIUM BROMIDE AND ALBUTEROL SULFATE 3 ML: 2.5; .5 SOLUTION RESPIRATORY (INHALATION) at 08:05

## 2022-05-30 RX ADMIN — SODIUM CHLORIDE SOLN NEBU 3% 4 ML: 3 NEBU SOLN at 08:05

## 2022-05-30 RX ADMIN — PIPERACILLIN SODIUM AND TAZOBACTAM SODIUM 4.5 G: 4; .5 INJECTION, POWDER, LYOPHILIZED, FOR SOLUTION INTRAVENOUS at 08:05

## 2022-05-30 RX ADMIN — LORAZEPAM 1 MG: 2 INJECTION INTRAMUSCULAR; INTRAVENOUS at 04:05

## 2022-05-30 RX ADMIN — SODIUM CHLORIDE SOLN NEBU 3% 4 ML: 3 NEBU SOLN at 01:05

## 2022-05-30 RX ADMIN — ATORVASTATIN CALCIUM 40 MG: 20 TABLET, FILM COATED ORAL at 09:05

## 2022-05-30 RX ADMIN — METOPROLOL TARTRATE 12.5 MG: 25 TABLET, FILM COATED ORAL at 09:05

## 2022-05-30 RX ADMIN — HYDRALAZINE HYDROCHLORIDE 10 MG: 20 INJECTION, SOLUTION INTRAMUSCULAR; INTRAVENOUS at 05:05

## 2022-05-30 RX ADMIN — POTASSIUM BICARBONATE 35 MEQ: 391 TABLET, EFFERVESCENT ORAL at 06:05

## 2022-05-30 RX ADMIN — POTASSIUM & SODIUM PHOSPHATES POWDER PACK 280-160-250 MG 2 PACKET: 280-160-250 PACK at 06:05

## 2022-05-30 RX ADMIN — PIPERACILLIN SODIUM AND TAZOBACTAM SODIUM 4.5 G: 4; .5 INJECTION, POWDER, LYOPHILIZED, FOR SOLUTION INTRAVENOUS at 12:05

## 2022-05-30 RX ADMIN — LEVOTHYROXINE SODIUM 88 MCG: 88 TABLET ORAL at 05:05
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
## 2022-05-30 NOTE — PT/OT/SLP DISCHARGE
Occupational Therapy Discharge Summary    Emilia Cotto  MRN: [id number]   Principal Problem: Stroke      Patient Discharged from acute Occupational Therapy on 5/30/2022.  Please refer to prior OT note dated 5/27/2022 for functional status.    Assessment:      Pt transferred to comfort care per family request.    Objective:     GOALS:   Multidisciplinary Problems     Occupational Therapy Goals        Problem: Occupational Therapy    Goal Priority Disciplines Outcome Interventions   Occupational Therapy Goal     OT, PT/OT Ongoing, Progressing    Description: Goals set on 5/27/2022 with expiration date 6/10/2022:  Patient will increase functional independence with ADLs by performing:    Supine <> Sit with Mod A.   Feeding while seated EOB/bedside chair with  Mod A.   Grooming while seated at EOB with  Mod A.   UB Dressing with Mod A.   LB Dressing with Mod A.   Step transfer with  Mod A with DME as needed.  Pt will demonstrate understanding of education provided regarding energy conservation and task modification through teach-back method.   Patient and/or patient's family will verbalize understanding of POC and post d/c support needs, and personal risk factors for fall risk reduction.                       Reasons for Discontinuation of Therapy Services  Transfer to alternate level of care.      Plan:     Patient Discharged to: Palliative Care/Hospice    5/30/2022

## 2022-05-30 NOTE — SUBJECTIVE & OBJECTIVE
Interval History:  >4 elements OR status of 3 inpatient conditions    Review of Systems   Unable to perform ROS: Patient nonverbal   2 systems OR Unable to obtain a complete ROS due to level of consciousness.  Objective:     Vitals:  Temp: 98.6 °F (37 °C)  Pulse: 95  Rhythm: atrial rhythm  BP: (!) 163/70  MAP (mmHg): 101  Resp: (!) 24  SpO2: 98 %  Oxygen Concentration (%): 28  O2 Device (Oxygen Therapy): venti mask    Temp  Min: 98 °F (36.7 °C)  Max: 100.4 °F (38 °C)  Pulse  Min: 64  Max: 104  BP  Min: 112/55  Max: 164/72  MAP (mmHg)  Min: 78  Max: 121  Resp  Min: 22  Max: 59  SpO2  Min: 96 %  Max: 100 %  Oxygen Concentration (%)  Min: 28  Max: 28    05/29 0701 - 05/30 0700  In: -   Out: 200 [Urine:200]   Unmeasured Output  Urine Occurrence: 1  Stool Occurrence: 1  Emesis Occurrence: 1  Pad Count: 1       Physical Exam  Constitutional: res distress  Eyes: Conjunctiva clear, anicteric. Lids no lesions.  Head/Ears/Nose/Mouth/Throat/Neck: Moist mucous membranes. External ears, nose atraumatic.   Cardiovascular: IrRegular rhythm. ESM  Respiratory: Diffuse crackling   Gastrointestinal: Soft, nondistended, nontender. + bowel sounds.      Neurologic Examination:    -Mental Status: Opens eyes minimally to voice. Mute and doesn't follow commands   -Cranial nerves: Pupils equal, round, and reactive bilateral. Left gaze deviation.   -Motor: Moves left side spon and withdraw right side to noxious stimuli       Medications:  Continuous Scheduledalbuterol-ipratropium, 3 mL, Q6H  atorvastatin, 40 mg, Daily  digoxin, 0.25 mg, Daily  heparin (porcine), 5,000 Units, Q8H  levothyroxine, 88 mcg, Before breakfast  metoprolol tartrate, 12.5 mg, BID  mupirocin, , BID  piperacillin-tazobactam (ZOSYN) IVPB, 4.5 g, Q8H  senna-docusate 8.6-50 mg, 1 tablet, BID  sodium chloride 3%, 4 mL, Q6H  vancomycin (VANCOCIN) IVPB, 1,000 mg, Q24H    PRNacetaminophen, 650 mg, Q6H PRN  albuterol-ipratropium, 3 mL, Q4H PRN  hydrALAZINE, 10 mg, Q4H  PRN  labetaloL, 10 mg, Q4H PRN  magnesium oxide, 800 mg, PRN  magnesium oxide, 800 mg, PRN  ondansetron, 4 mg, Q6H PRN  potassium bicarbonate, 35 mEq, PRN  potassium bicarbonate, 50 mEq, PRN  potassium bicarbonate, 60 mEq, PRN  potassium, sodium phosphates, 2 packet, PRN  potassium, sodium phosphates, 2 packet, PRN  potassium, sodium phosphates, 2 packet, PRN  sodium chloride 0.9%, 10 mL, PRN  vancomycin - pharmacy to dose, , pharmacy to manage frequency      Today I personally reviewed pertinent medications, lines/drains/airways, imaging, cardiology results, laboratory results, notably:    Diet  Diet NPO  Diet NPO

## 2022-05-30 NOTE — ASSESSMENT & PLAN NOTE
Upon discussion with family at bedside comfort care measures were initiated  Plan to transfer to hospice
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
## 2022-05-30 NOTE — PT/OT/SLP EVAL
"Speech Language Pathology Evaluation  Cognitive Communication    Patient Name:  Emilia Cotto   MRN:  [id number]  Admitting Diagnosis: Embolic stroke involving left middle cerebral artery    Recommendations:     Recommendations:                General Recommendations:  Dysphagia therapy and Speech/language therapy  Diet recommendations:  NPO, NPO   Aspiration Precautions: Strict aspiration precautions   General Precautions: Standard, aspiration  Communication strategies:  none    History:     Past Medical History:   Diagnosis Date    A-fib     A-fib     Anticoagulant long-term use     Hypertension     Thyroid disease        Past Surgical History:   Procedure Laterality Date    HYSTERECTOMY      THYROID SURGERY Right     lobectomy       Social History: Patient lives with family.      Prior diet: regular with thin          Subjective     "she said a few words but I could not understand them" per daughter  Patient goals: narendra     Pain/Comfort:  · Pain Rating 1: 0/10  · Pain Rating Post-Intervention 1: 0/10    Respiratory Status: venti mask    Objective:   Cognitive Status:    Given max verbal and tactile cues, pt. Roused briefly/opening eyes but did not remain alert.        Receptive Language:   Comprehension:   Pt. Did not model or follow any commands and made no response to simple yes/no questions      Expressive Language:  Verbal:    Pt. Non verbal/no vocal with no voice elicited when asked to count, complete automatic sentences      Motor Speech:  Non vocal    Voice:   Non vocal    Visual-Spatial:  tba    Reading:   tba     Written Expression:   tba    Treatment: ongoing education provided to daughter re role of slp and rehab plan of care.     Assessment:   Emilia Cotto is a 81 y.o. female with an SLP diagnosis of Dysphagia.  She presents with somnolence    Goals:   Multidisciplinary Problems     SLP Goals        Problem: SLP    Goal Priority Disciplines Outcome   SLP Goal     SLP Ongoing, Progressing "   Description: Speech Language Pathology Goals  Goals expected to be met by 6/4:  1. Pt will participate in ongoing assessment of swallow function to determine safest and least restrictive diet.   2. Pt will participate in assessment of speech/language/cognitive skills to determine future therapeutic plan of care.                    Plan:   · Patient to be seen:  4 x/week   · Plan of Care expires:  06/26/22  · Plan of Care reviewed with:  patient, daughter   · SLP Follow-Up:  Yes       Discharge recommendations:  Discharge Facility/Level of Care Needs: nursing facility, skilled   Barriers to Discharge:  Level of Skilled Assistance Needed 24 hour    Time Tracking:   SLP Treatment Date:   05/30/22  Speech Start Time:  0945  Speech Stop Time:  0958     Speech Total Time (min):  13 min    Billable Minutes: Eval 5  and Self Care/Home Management Training 8    05/30/2022

## 2022-05-30 NOTE — NURSING
Patient discharged to home hospice, transported via stretcher by [facility]. Patient arouses to pain. Patient on 6L 28% Venti mask. Family notified patient on the way to home hospice (Critical access hospital) via ambulance.

## 2022-05-30 NOTE — SUBJECTIVE & OBJECTIVE
Review of Systems  Unable to obtain a complete ROS due to level of consciousness.  Objective:     Vitals:  Temp: 98.6 °F (37 °C)  Pulse: 85  Rhythm: atrial rhythm  BP: (!) 143/63  MAP (mmHg): 91  Resp: (!) 29  SpO2: 98 %  Oxygen Concentration (%): 28  O2 Device (Oxygen Therapy): venti mask    Temp  Min: 98 °F (36.7 °C)  Max: 98.6 °F (37 °C)  Pulse  Min: 64  Max: 113  BP  Min: 112/55  Max: 165/72  MAP (mmHg)  Min: 78  Max: 121  Resp  Min: 22  Max: 59  SpO2  Min: 96 %  Max: 100 %  Oxygen Concentration (%)  Min: 28  Max: 28    05/29 0701 - 05/30 0700  In: -   Out: 200 [Urine:200]   Unmeasured Output  Urine Occurrence: 1  Stool Occurrence: 1  Emesis Occurrence: 1  Pad Count: 1       Physical Exam  GA: Alert, comfortable, no acute distress.   HEENT: No scleral icterus or JVD.   Pulmonary: Clear to auscultation A/L.   Cardiac: RRR S1 & S2 w/o rubs/murmurs/gallops.   Abdominal: Bowel sounds present x 4. No appreciable hepatosplenomegaly.  Skin: No jaundice, rashes, or visible lesions.  Neuro:  --GCS: E3 V1 M5  --Mental Status: awake, global aphasic    --CN II-XII grossly intact.   --Pupils 3 mm, PERRL.   --Corneal reflex, gag, cough intact.  --LUE spont  --RUE withdraws  --LLE spont  --RLE withdraws    Medications:  Continuous Scheduledscopolamine, 1 patch, Q3 Days    PRNlorazepam, 1 mg, Q30 Min PRN  morphine, 2 mg, Q30 Min PRN      Today I personally reviewed pertinent medications, lines/drains/airways, imaging, cardiology results, laboratory results,    Diet  Diet NPO

## 2022-05-30 NOTE — PT/OT/SLP PROGRESS
Speech Language Pathology  Discharge    Emilia Cotto  MRN: [id number]    Patient not seen today secondary to pt transitioned to comfort measures at this time. Orders discontinued by MD. Safest recommendation is for patient to remain NPO at this time.  If team desires to continue po intake for pleasure/quality of life, despite risk for aspiration, feel puree diet with honey thick liquids to be safest.      5/30/2022

## 2022-05-30 NOTE — PLAN OF CARE
[facility] Neuro Critical Care  Discharge Final Note    Primary Care Provider: Tay Young Jr, MD    Expected Discharge Date: 5/30/2022     Patient to discharge to home hospice with [facility].   SW arranged transport for 5 pm.  DME to arrive at 6:30 pm     Final Discharge Note (most recent)     Final Note - 05/30/22 1613        Final Note    Assessment Type Final Discharge Note     Anticipated Discharge Disposition Hospice/Home                Follow-up Information     [facility] Follow up today.    Specialty: Hospice Services  Why: Hospice  Contact information:  [address] [place] [address] [place] [id number]  [phone]                       Lisbeth Rodney RN, CCRN-K, [facility]  Neuro-Critical Care   [phone]

## 2022-05-30 NOTE — SUBJECTIVE & OBJECTIVE
Review of Systems  Unable to obtain a complete ROS due to level of consciousness.  Objective:     Vitals:  Temp: 98.6 °F (37 °C)  Pulse: (!) 113  Rhythm: atrial rhythm  BP: (!) 163/70  MAP (mmHg): 101  Resp: (!) 25  SpO2: 99 %  Oxygen Concentration (%): 28  O2 Device (Oxygen Therapy): venti mask    Temp  Min: 98 °F (36.7 °C)  Max: 100.4 °F (38 °C)  Pulse  Min: 64  Max: 113  BP  Min: 112/55  Max: 164/72  MAP (mmHg)  Min: 78  Max: 121  Resp  Min: 22  Max: 59  SpO2  Min: 96 %  Max: 100 %  Oxygen Concentration (%)  Min: 28  Max: 28    05/29 0701 - 05/30 0700  In: -   Out: 200 [Urine:200]   Unmeasured Output  Urine Occurrence: 1  Stool Occurrence: 1  Emesis Occurrence: 1  Pad Count: 1       Physical Exam  GA: Alert, comfortable, no acute distress.   HEENT: No scleral icterus or JVD.   Pulmonary: Clear to auscultation A/L.   Cardiac: RRR S1 & S2 w/o rubs/murmurs/gallops.   Abdominal: Bowel sounds present x 4. No appreciable hepatosplenomegaly.  Skin: No jaundice, rashes, or visible lesions.  Neuro:  --GCS: E3 V1 M5  --Mental Status: awake, global aphasic    --CN II-XII grossly intact.   --Pupils 3 mm, PERRL.   --Corneal reflex, gag, cough intact.  --LUE spont  --RUE withdraws  --LLE spont  --RLE withdraws    Medications:  Continuous Scheduledalbuterol-ipratropium, 3 mL, Q6H  atorvastatin, 40 mg, Daily  digoxin, 0.25 mg, Daily  heparin (porcine), 5,000 Units, Q8H  levothyroxine, 88 mcg, Before breakfast  metoprolol tartrate, 12.5 mg, BID  mupirocin, , BID  piperacillin-tazobactam (ZOSYN) IVPB, 4.5 g, Q8H  senna-docusate 8.6-50 mg, 1 tablet, BID  sodium chloride 3%, 4 mL, Q6H  vancomycin (VANCOCIN) IVPB, 1,000 mg, Q24H    PRNacetaminophen, 650 mg, Q6H PRN  albuterol-ipratropium, 3 mL, Q4H PRN  hydrALAZINE, 10 mg, Q4H PRN  labetaloL, 10 mg, Q4H PRN  magnesium oxide, 800 mg, PRN  magnesium oxide, 800 mg, PRN  ondansetron, 4 mg, Q6H PRN  potassium bicarbonate, 35 mEq, PRN  potassium bicarbonate, 50 mEq, PRN  potassium  bicarbonate, 60 mEq, PRN  potassium, sodium phosphates, 2 packet, PRN  potassium, sodium phosphates, 2 packet, PRN  potassium, sodium phosphates, 2 packet, PRN  sodium chloride 0.9%, 10 mL, PRN  vancomycin - pharmacy to dose, , pharmacy to manage frequency      Today I personally reviewed pertinent medications, lines/drains/airways, imaging, cardiology results, laboratory results, microbiology results,     Diet  Diet NPO

## 2022-05-30 NOTE — PROGRESS NOTES
Pharmacokinetic Assessment Follow Up: IV Vancomycin    Vancomycin order has been discontinued. Pharmacy will sign off. Please reconsult as needed! Thank you!    Please contact pharmacy for questions regarding this assessment.    Thank you for the consult,   Ember Summers

## 2022-05-30 NOTE — HOSPITAL COURSE
05/30/2022 worsening res distress, planning a family meeting today   05/30/2022: SBP goal <160, start TF, plan to discuss with family about patient's goals of care  Plan to transfer to hospice today

## 2022-05-30 NOTE — ASSESSMENT & PLAN NOTE
S/p thrombectomy complicated with ICH  Poor neurological exam  DNR/DNI  Comfort care measures initiated upon discussion with family at bedside  Plan to transfer to hospice today
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
## 2022-05-30 NOTE — ASSESSMENT & PLAN NOTE
S/p thrombectomy complicated with ICH  Poor neurological exam  DNR/DNI, planing a family discussion today

## 2022-06-02 LAB
BACTERIA BLD CULT: NORMAL
BACTERIA BLD CULT: NORMAL
BACTERIA SPEC AEROBE CULT: ABNORMAL
BACTERIA SPEC AEROBE CULT: ABNORMAL
GRAM STN SPEC: ABNORMAL

## 2022-06-15 NOTE — PROCEDURES
DATE: 5/28/22    EEG NUMBER: FH -1    REFERRING PHYSICIAN:  Dr. Alfredo      This EEG was performed to assess for subclinical seizures      ELECTROENCEPHALOGRAM REPORT     METHODOLOGY:  Electroencephalographic (EEG) recording is with electrodes placed according to the International 10-20 placement system.  Thirty two (32) channels of digital signal are simultaneously recorded from the scalp and may include EKG, EMG, and/or eye monitors.   Recording band pass was 0.1 to 512 hz.  Digital video recording of the patient is simultaneously recorded with the EEG.  The nursing staff report clinical symptoms and may press an event button when the patient has symptoms of clinical interest to the treating physicians.  EEG and video recording is stored and archived in digital format.  The entire recording is visually reviewed, and the times identified by computer analysis as being spikes or seizures are reviewed again.  Activation procedures which include photic stimulation, hyperventilation and instructing patients to perform simple task are done in selected patients.   Compresses spectral analysis (CSA) is also performed on the activity recorded from each individual channel.  This is displayed as a power display of frequencies from 0 to 30 Hz over time.   The CSA analysis is done and displayed continuously.  This is reviewed for asymmetries in power between homologous areas of the scalp and for presence of changes in power which can be seen when seizures occur.  Sections of suspected abnormalities on the CSA is then compared with the original EEG recording.                [employer] software was also utilized in the review of this study.  This software suite analyzes the EEG recording in multiple domains.  Coherence and rhythmicity is computed to identify EEG sections which may contain organized seizures.  Each channel undergoes analysis to detect presence of spike and sharp waves which have special and morphological  characteristic of epileptic activity.  The routine EEG recording is converted from spacial into frequency domain.  This is then displayed comparing homologous areas to identify areas of significant asymmetry.  Algorithm to identify non-cortically generated artifact is used to separate eye movement, EMG and other artifact from the EEG.     Recording times  Start on May 28, 2022 at hour 10 minute 32 seconds 1  End on May 29, 2022 at hours 7 minute 0 seconds 12  The total time of EEG recording for the study was 20 hours and 57 min     EEG FINDINGS:  The recording was obtained with a number of standard bipolar and referential montages during wakefulness, drowsiness and sleep.  In the alert state, the posterior background rhythm was a asymmetric.  Relative slowing of the right hemisphere was noted.  The left hemisphere demonstrated posterior dominant rhythm of 7 hertz.  The right hemisphere demonstrated posterior rhythm of 5-6 hertz.  The mixed delta range slowing was noted independently in both hemispheres.  Intermittent delta range focal slowing was noted in the left temporal region.  Intermittent lateralized triphasic morphology complexes were noted in the right hemisphere.  At times these were sharply contoured at a frequency of up to 2 hertz.  No evolving electrographic seizures were visualized.  During deeper stages of clinical sleep spindles are noted in the right hemisphere.    The EKG channel revealed an irregular rhythm     IMPRESSION:  This is an abnormal EEG during wakefulness, drowsiness and sleep. Diffuse slowing was noted.  Intermittent left temporal focal slow complexes were noted.  Pseudo periodic right lateralized slow complexes as well as epileptiform discharges were noted.     CLINICAL CORRELATION:  The patient is a 81-year-old female with a history of acute left hemisphere stroke who is currently not maintained on antiseizure medications.  This is an abnormal EEG during wakefulness, drowsiness and  sleep.  The overall degree of slowing disorganization suggestive moderate degree of encephalopathy nonspecific to the cause.  The background asymmetry suggestive of structural abnormality in the right hemisphere correlates a history of right hemisphere stroke.  The presence of lateralized periodic epileptiform discharges in right hemisphere is suggestive cortical irritability in this hemisphere.  The presence of intermittent focal slowing in the left temporal region suggestive focal neural dysfunction this region.  No seizures were recorded during this study.

## 2022-06-16 NOTE — PROCEDURES
DATE: 5/29/22    EEG NUMBER:  FH -2    REFERRING PHYSICIAN:  Dr. Alfredo      This EEG was performed to assess for subclinical seizures      ELECTROENCEPHALOGRAM REPORT     METHODOLOGY:  Electroencephalographic (EEG) recording is with electrodes placed according to the International 10-20 placement system.  Thirty two (32) channels of digital signal are simultaneously recorded from the scalp and may include EKG, EMG, and/or eye monitors.   Recording band pass was 0.1 to 512 hz.  Digital video recording of the patient is simultaneously recorded with the EEG.  The nursing staff report clinical symptoms and may press an event button when the patient has symptoms of clinical interest to the treating physicians.  EEG and video recording is stored and archived in digital format.  The entire recording is visually reviewed, and the times identified by computer analysis as being spikes or seizures are reviewed again.  Activation procedures which include photic stimulation, hyperventilation and instructing patients to perform simple task are done in selected patients.   Compresses spectral analysis (CSA) is also performed on the activity recorded from each individual channel.  This is displayed as a power display of frequencies from 0 to 30 Hz over time.   The CSA analysis is done and displayed continuously.  This is reviewed for asymmetries in power between homologous areas of the scalp and for presence of changes in power which can be seen when seizures occur.  Sections of suspected abnormalities on the CSA is then compared with the original EEG recording.                [employer] software was also utilized in the review of this study.  This software suite analyzes the EEG recording in multiple domains.  Coherence and rhythmicity is computed to identify EEG sections which may contain organized seizures.  Each channel undergoes analysis to detect presence of spike and sharp waves which have special and morphological  characteristic of epileptic activity.  The routine EEG recording is converted from spacial into frequency domain.  This is then displayed comparing homologous areas to identify areas of significant asymmetry.  Algorithm to identify non-cortically generated artifact is used to separate eye movement, EMG and other artifact from the EEG.     Recording times  Start on May 29, 2022 at hours 7 minute 1 seconds 43  End on May 29, 2022 at hours 16 minute 59 seconds 31  The total time of EEG recording for the study was  9 hours and 24    EEG FINDINGS:  The recording was obtained with a number of standard bipolar and referential montages during wakefulness, drowsiness and sleep.  In the alert state, the posterior background rhythm was a asymmetric.  Relative slowing of the right hemisphere was noted.  The left hemisphere demonstrated posterior dominant rhythm of 7 hertz.  The right hemisphere demonstrated posterior rhythm of 5-6 hertz.  The mixed delta range slowing was noted independently in both hemispheres.  Intermittent delta range focal slowing was noted in the left temporal region.  Intermittent lateralized triphasic morphology complexes were noted in the right hemisphere.  At times these were sharply contoured at a frequency of up to 2 hertz.  No evolving electrographic seizures were visualized.  During deeper stages of clinical sleep spindles are noted in the right hemisphere.    The EKG channel revealed an irregular rhythm     IMPRESSION:  This is an abnormal EEG during wakefulness, drowsiness and sleep. Diffuse slowing was noted.  Intermittent left temporal focal slow complexes were noted.  Pseudo periodic right lateralized slow complexes as well as epileptiform discharges were noted.     CLINICAL CORRELATION:  The patient is a 81-year-old female with a history of acute left hemisphere stroke who is currently not maintained on antiseizure medications.  This is an abnormal EEG during wakefulness, drowsiness and  sleep.  The overall degree of slowing disorganization suggestive moderate degree of encephalopathy nonspecific to the cause.  The background asymmetry suggestive of structural abnormality in the right hemisphere correlates a history of right hemisphere stroke.  The presence of lateralized periodic epileptiform discharges in right hemisphere is suggestive cortical irritability in this hemisphere.  The presence of intermittent focal slowing in the left temporal region suggestive focal neural dysfunction this region.  No seizures were recorded during this study.